# Patient Record
Sex: FEMALE | URBAN - METROPOLITAN AREA
[De-identification: names, ages, dates, MRNs, and addresses within clinical notes are randomized per-mention and may not be internally consistent; named-entity substitution may affect disease eponyms.]

---

## 2017-01-29 DIAGNOSIS — G93.2 IDIOPATHIC INTRACRANIAL HYPERTENSION: Primary | ICD-10-CM

## 2017-01-29 DIAGNOSIS — H53.10 SUBJECTIVE VISUAL DISTURBANCE: ICD-10-CM

## 2017-02-02 ENCOUNTER — OFFICE VISIT (OUTPATIENT)
Dept: OPHTHALMOLOGY | Facility: CLINIC | Age: 67
End: 2017-02-02
Attending: OPHTHALMOLOGY
Payer: MEDICARE

## 2017-02-02 DIAGNOSIS — H53.10 SUBJECTIVE VISUAL DISTURBANCE: ICD-10-CM

## 2017-02-02 DIAGNOSIS — G93.2 IIH (IDIOPATHIC INTRACRANIAL HYPERTENSION): Primary | ICD-10-CM

## 2017-02-02 DIAGNOSIS — G93.2 IDIOPATHIC INTRACRANIAL HYPERTENSION: ICD-10-CM

## 2017-02-02 PROCEDURE — 92133 CPTRZD OPH DX IMG PST SGM ON: CPT | Mod: ZF | Performed by: OPHTHALMOLOGY

## 2017-02-02 PROCEDURE — 99213 OFFICE O/P EST LOW 20 MIN: CPT | Mod: ZF

## 2017-02-02 PROCEDURE — 92083 EXTENDED VISUAL FIELD XM: CPT | Mod: ZF | Performed by: OPHTHALMOLOGY

## 2017-02-02 ASSESSMENT — SLIT LAMP EXAM - LIDS
COMMENTS: NORMAL
COMMENTS: NORMAL

## 2017-02-02 ASSESSMENT — TONOMETRY
OS_IOP_MMHG: 17
OD_IOP_MMHG: 15
IOP_METHOD: TONOPEN

## 2017-02-02 ASSESSMENT — EXTERNAL EXAM - LEFT EYE: OS_EXAM: NORMAL

## 2017-02-02 ASSESSMENT — CONF VISUAL FIELD
OD_NORMAL: 1
OS_NORMAL: 1

## 2017-02-02 ASSESSMENT — VISUAL ACUITY
OD_SC: 20/20
METHOD: SNELLEN - LINEAR
OS_SC+: +1
OD_SC+: -2
OS_SC: 20/25

## 2017-02-02 ASSESSMENT — EXTERNAL EXAM - RIGHT EYE: OD_EXAM: NORMAL

## 2017-02-02 NOTE — NURSING NOTE
"Chief Complaints and History of Present Illnesses   Patient presents with     Neurologic Problem     iih     HPI    Affected eye(s):  Both   Symptoms:     Blurred vision      Frequency:  Intermittent       Do you have eye pain now?:  No      Comments:  - Currently on 500 mg diamox daily. Feels dose is not strong enough. Still c/o \"thumping\" sensation in head. Worse in the evening.   - feels vision in right eye is more blurry.     Lidia TRACY 10:15 AM February 2, 2017                    "

## 2017-02-02 NOTE — PROGRESS NOTES
Assessment & Plan     Yasmeen Mcdonald is a 66 year old female with the following diagnoses:   1. Idiopathic intracranial hypertension    2. Subjective visual disturbance       She had unilat optic disc edema and Idiopathic Intracranial Hypertension (IIH). Her optic disc edema improved using diamox but when stopped her optic disc edema returned.  Today, her optic disc edema is again much improved.  She is currently on 250 twice a day of diamox.  Will go down slowly.  Cut to 375 x 2 months, then 250 x 2 months, then 125 and follow up in 6 months on 125.           Attending Physician Attestation:  I have seen and examined this patient.  I have confirmed and edited as necessary the chief complaint(s), history of present illness, review of systems, relevant history, and examination findings as documented by others.  I have personally reviewed the relevant tests, images, and reports as documented above.  I have confirmed and edited as necessary the assessment and plan and agree with this note.  - Ryan Neville MD 11:00 AM 2/2/2017

## 2017-02-02 NOTE — MR AVS SNAPSHOT
After Visit Summary   2/2/2017    Yasmeen Mcdonald    MRN: [id number]           Patient Information     Date Of Birth          1950        Visit Information        Provider Department      2/2/2017 10:30 AM Ryan Neville MD [facility]        Today's Diagnoses     IIH (idiopathic intracranial hypertension)    -  1     Idiopathic intracranial hypertension         Subjective visual disturbance            Follow-ups after your visit        Follow-up notes from your care team     Return in about 6 months (around 8/2/2017).      Your next 10 appointments already scheduled     Aug 03, 2017 10:00 AM   RETURN NEURO with Ryan Neville MD   Eye Clinic ([facility])    [address]  [address]  [address]  [facility] [id number] [phone]              Future tests that were ordered for you today     Open Future Orders        Priority Expected Expires Ordered    Color Vision - Screening OU (both eyes) Routine  8/6/2018 2/2/2017    DILATED FUNDUS EXAM Routine  8/6/2018 2/2/2017    IOP Measurement Routine  8/6/2018 2/2/2017    OCT Optic Nerve RNFL Spectralis OU (both eyes) Routine  8/6/2018 2/2/2017            Who to contact     Please call your clinic at [phone] to:    Ask questions about your health    Make or cancel appointments    Discuss your medicines    Learn about your test results    Speak to your doctor   If you have compliments or concerns about an experience at your clinic, or if you wish to file a complaint, please contact [facility] Patient Relations at [phone] or email us at [email or web address]         Additional Information About Your Visit        MyChart Information     FlightOffice is an electronic gateway that provides easy, online access to your medical records. With FlightOffice, you can request a clinic appointment, read your test results, renew a prescription or communicate with your care team.     To  sign up for Togic Softwaret visit the website at [email or web address]   You will be asked to enter the access code listed below, as well as some personal information. Please follow the directions to create your username and password.     Your access code is: [email or web address]  Expires: 2017  8:30 AM     Your access code will  in 90 days. If you need help or a new code, please contact your [facility] or call [phone] for assistance.        Care EveryWhere ID     This is your Care EveryWhere ID. This could be used by other organizations to access your [place] medical records  [id number]         Blood Pressure from Last 3 Encounters:   16 135/66    Weight from Last 3 Encounters:   No data found for Wt              We Performed the Following     Color Vision - Screening OU (both eyes)     Glaucoma Top OU     IOP Measurement     OCT Optic Nerve RNFL Spectralis OU (both eyes)        Primary Care Provider Office Phone # Fax #    Carola Grant Monty Loja [phone]       [facility] [address]  [facility] [address]        Thank you!     Thank you for choosing EYE CLINIC  for your care. Our goal is always to provide you with excellent care. Hearing back from our patients is one way we can continue to improve our services. Please take a few minutes to complete the written survey that you may receive in the mail after your visit with us. Thank you!             Your Updated Medication List - Protect others around you: Learn how to safely use, store and throw away your medicines at www.disposemymeds.org.          This list is accurate as of: 17 11:05 AM.  Always use your most recent med list.                   Brand Name Dispense Instructions for use    * acetaZOLAMIDE 250 MG tablet    DIAMOX    120 tablet    Take 2 tablets (500 mg) by mouth 2 times daily       * acetaZOLAMIDE 125 MG tablet    DIAMOX    60 tablet    Take 1 tablet  (125 mg) by mouth 2 times daily       * acetaZOLAMIDE 250 MG tablet    DIAMOX    30 tablet    Take 0.5 tablets (125 mg) by mouth 2 times daily       * acetaZOLAMIDE 250 MG tablet    DIAMOX    60 tablet    Take 1 tablet (250 mg) by mouth 2 times daily       CITALOPRAM HYDROBROMIDE PO      Take 20 mg by mouth daily       cyanocolbalamin 100 MCG tablet    vitamin  B-12     Take 50 mcg by mouth daily       fluticasone 50 MCG/ACT spray    FLONASE     Spray 2 sprays into both nostrils daily       glucosamine chondroitin 1500 complex Caps      Take 2 tablets by mouth daily       hydroxychloroquine 200 MG tablet    PLAQUENIL     Take 200 mg by mouth 2 times daily       minoxidil 2 % external solution    ROGAINE     Apply topically 2 times daily       mometasone-formoterol 200-5 MCG/ACT oral inhaler    DULERA     Inhale 2 puffs into the lungs 2 times daily       montelukast 10 MG tablet    SINGULAIR     Take 10 mg by mouth At Bedtime       * Notice:  This list has 4 medication(s) that are the same as other medications prescribed for you. Read the directions carefully, and ask your doctor or other care provider to review them with you.

## 2017-05-18 DIAGNOSIS — H47.10 OPTIC DISC EDEMA: ICD-10-CM

## 2017-05-18 DIAGNOSIS — G93.2 IIH (IDIOPATHIC INTRACRANIAL HYPERTENSION): ICD-10-CM

## 2017-05-18 DIAGNOSIS — H53.10 SUBJECTIVE VISUAL DISTURBANCE: ICD-10-CM

## 2017-05-18 RX ORDER — ACETAZOLAMIDE 250 MG/1
250 TABLET ORAL DAILY
Qty: 15 TABLET | Refills: 0 | Status: SHIPPED | OUTPATIENT
Start: 2017-05-18 | End: 2017-08-03

## 2017-05-18 RX ORDER — ACETAZOLAMIDE 125 MG/1
125 TABLET ORAL DAILY
Qty: 30 TABLET | Refills: 6 | Status: SHIPPED | OUTPATIENT
Start: 2017-05-18 | End: 2017-08-03

## 2017-08-03 ENCOUNTER — OFFICE VISIT (OUTPATIENT)
Dept: OPHTHALMOLOGY | Facility: CLINIC | Age: 67
End: 2017-08-03
Attending: OPHTHALMOLOGY
Payer: MEDICARE

## 2017-08-03 DIAGNOSIS — G93.2 IDIOPATHIC INTRACRANIAL HYPERTENSION: ICD-10-CM

## 2017-08-03 DIAGNOSIS — H53.10 SUBJECTIVE VISUAL DISTURBANCE: ICD-10-CM

## 2017-08-03 DIAGNOSIS — G93.2 IIH (IDIOPATHIC INTRACRANIAL HYPERTENSION): ICD-10-CM

## 2017-08-03 PROCEDURE — 99214 OFFICE O/P EST MOD 30 MIN: CPT | Mod: ZF

## 2017-08-03 PROCEDURE — 92133 CPTRZD OPH DX IMG PST SGM ON: CPT | Mod: ZF | Performed by: OPHTHALMOLOGY

## 2017-08-03 ASSESSMENT — EXTERNAL EXAM - LEFT EYE: OS_EXAM: NORMAL

## 2017-08-03 ASSESSMENT — CONF VISUAL FIELD
OD_NORMAL: 1
OS_NORMAL: 1

## 2017-08-03 ASSESSMENT — VISUAL ACUITY
OS_SC+: +1
OS_SC: 20/25
OD_SC: 20/20
OD_SC+: -2
METHOD: SNELLEN - LINEAR

## 2017-08-03 ASSESSMENT — REFRACTION_WEARINGRX
SPECS_TYPE: READERS
OS_CYLINDER: +0.50
OD_CYLINDER: +0.50
OS_SPHERE: +3.50
OD_AXIS: 175
OS_AXIS: 012
OD_SPHERE: +3.00

## 2017-08-03 ASSESSMENT — SLIT LAMP EXAM - LIDS
COMMENTS: NORMAL
COMMENTS: NORMAL

## 2017-08-03 ASSESSMENT — TONOMETRY
OD_IOP_MMHG: 18
OS_IOP_MMHG: 20
IOP_METHOD: TONOPEN

## 2017-08-03 ASSESSMENT — EXTERNAL EXAM - RIGHT EYE: OD_EXAM: NORMAL

## 2017-08-03 NOTE — MR AVS SNAPSHOT
After Visit Summary   8/3/2017    Yasmeen Mcdonald    MRN: [id number]           Patient Information     Date Of Birth          1950        Visit Information        Provider Department      8/3/2017 10:00 AM Ryan Neville MD [facility]        Today's Diagnoses     Idiopathic intracranial hypertension        Subjective visual disturbance        IIH (idiopathic intracranial hypertension)           Follow-ups after your visit        Follow-up notes from your care team     Return in about 3 months (around 11/3/2017) for Vision, color, tension, dilate, RNFL.      Your next 10 appointments already scheduled     Nov 07, 2017  1:00 PM CST   RETURN NEURO with Ryan Neville MD   Eye Clinic ([facility])    [address]  [address] [facility]  [address]th [address] Clin [address]a  [facility] [address] [phone]              Who to contact     Please call your clinic at [phone] to:    Ask questions about your health    Make or cancel appointments    Discuss your medicines    Learn about your test results    Speak to your doctor   If you have compliments or concerns about an experience at your clinic, or if you wish to file a complaint, please contact [facility] Patient Relations at [phone] or email us at [email or web address]         Additional Information About Your Visit        MyChart Information     Omnituret is an electronic gateway that provides easy, online access to your medical records. With Glad to Have You, you can request a clinic appointment, read your test results, renew a prescription or communicate with your care team.     To sign up for Omnituret visit the website at [email or web address]   You will be asked to enter the access code listed below, as well as some personal information. Please follow the directions to create your username and password.     Your access code is: [email or web address]  Expires: 10/18/2017  6:30 AM     Your access code will   in 90 days. If you need help or a new code, please contact your [facility] or call [phone] for assistance.        Care EveryWhere ID     This is your Care EveryWhere ID. This could be used by other organizations to access your [place] medical records  [id number]         Blood Pressure from Last 3 Encounters:   16 135/66    Weight from Last 3 Encounters:   No data found for Wt              We Performed the Following     DILATED FUNDUS EXAM     IOP Measurement     OCT Optic Nerve RNFL Spectralis OU (both eyes)          Today's Medication Changes          These changes are accurate as of: 8/3/17 11:07 AM.  If you have any questions, ask your nurse or doctor.               Stop taking these medicines if you haven't already. Please contact your care team if you have questions.     acetaZOLAMIDE 125 MG tablet   Commonly known as:  DIAMOX           acetaZOLAMIDE 250 MG tablet   Commonly known as:  DIAMOX           cyanocolbalamin 100 MCG tablet   Commonly known as:  vitamin  B-12           mometasone-formoterol 200-5 MCG/ACT oral inhaler   Commonly known as:  DULERA                    Primary Care Provider Office Phone # Fax #    Carola Graysoncristo [place] [phone]       [facility] [address]  [facility] [address]        Equal Access to Services     ADRIAN AVELAR : Hadii paras ku hadasho Soomaali, waaxda luqadaha, qaybta kaalmada adeegyada, waxay rosalia chaves. So [facility] [phone].    ATENCIÓN: Si habla español, tiene a sabillon disposición servicios gratuitos de asistencia lingüística. Llvonnie al [phone].    We comply with applicable federal civil rights laws and [place] laws. We do not discriminate on the basis of race, color, national origin, age, disability sex, sexual orientation or gender identity.            Thank you!     Thank you for choosing EYE CLINIC  for your care. Our goal is always to provide  you with excellent care. Hearing back from our patients is one way we can continue to improve our services. Please take a few minutes to complete the written survey that you may receive in the mail after your visit with us. Thank you!             Your Updated Medication List - Protect others around you: Learn how to safely use, store and throw away your medicines at www.disposemymeds.org.          This list is accurate as of: 8/3/17 11:07 AM.  Always use your most recent med list.                   Brand Name Dispense Instructions for use Diagnosis    CITALOPRAM HYDROBROMIDE PO      Take 20 mg by mouth daily        fluticasone 50 MCG/ACT spray    FLONASE     Spray 2 sprays into both nostrils daily        glucosamine chondroitin 1500 complex Caps      Take 2 tablets by mouth daily        hydroxychloroquine 200 MG tablet    PLAQUENIL     Take 200 mg by mouth 2 times daily        minoxidil 2 % external solution    ROGAINE     Apply topically 2 times daily        montelukast 10 MG tablet    SINGULAIR     Take 10 mg by mouth At Bedtime

## 2017-08-03 NOTE — NURSING NOTE
Chief Complaints and History of Present Illnesses   Patient presents with     Neurologic Problem     iih     HPI    Symptoms:              Comments:  Yasmeen is a 67 year old female presenting with a history of:    1. IIH    Tapered from 250 mg diamox to nothing. Discontinued 125 mg yesterday. Still c/o pressure feeling in head. No blurred vision. Wondering if she is being screened for plaquenil toxicity. Has been on plaquenil since 2009 for RA.     Lidia TRACY 9:46 AM August 3, 2017

## 2017-08-03 NOTE — PROGRESS NOTES
Assessment & Plan     Yasmeen Mcdonald is a 67 year old female with the following diagnoses:   1. Idiopathic intracranial hypertension    2. Subjective visual disturbance    3. 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 Patient initially had improvement of disc edema, but edema returned on discontinuation of Diamox. Patient seen 6 months ago with improved disc edema and taper initiated. Patient taking 125 mg daily. She notes that she has slowly worsening pressure inside her head. Reports feeling that her ears have pressure constantly, but no pulsatile tinnitus. Reports occasional tinnitus and ear pain. Denies change in vision, diplopia, nauseated, vomiting.    OCT rNFL does not demonstrate swelling of the right optic disc nor the left. No edema present on examination. Decreased hearing in the left ear by finger rubbing. Patient without signs of active IIH. Will stop diamox and follow up in 2-3 months.      She has greater occipital neuralgia.  We discussed neck PT, gabapentin, and greater occipital nerve block and she wants to do nothing.  She has what sounds to be tension type headache. Could consider NSAIDs.               Attending Physician Attestation:  Complete documentation of historical and exam elements from today's encounter can be found in the full encounter summary report (not reduplicated in this progress note).  I personally obtained the chief complaint(s) and history of present illness.  I confirmed and edited as necessary the review of systems, past medical/surgical history, family history, social history, and examination findings as documented by others; and I examined the patient myself.  I personally reviewed the relevant tests, images, and reports as documented above.  I formulated and edited as necessary the assessment and plan and discussed the findings and management plan with the patient and  family. - Ryan Neville MD

## 2018-03-28 DIAGNOSIS — G93.2 IIH (IDIOPATHIC INTRACRANIAL HYPERTENSION): Primary | ICD-10-CM

## 2018-03-29 ENCOUNTER — OFFICE VISIT (OUTPATIENT)
Dept: OPHTHALMOLOGY | Facility: CLINIC | Age: 68
End: 2018-03-29
Attending: OPHTHALMOLOGY
Payer: MEDICARE

## 2018-03-29 DIAGNOSIS — H47.393 OTHER DISORDERS OF OPTIC DISC, BILATERAL: Primary | ICD-10-CM

## 2018-03-29 DIAGNOSIS — G93.2 IIH (IDIOPATHIC INTRACRANIAL HYPERTENSION): ICD-10-CM

## 2018-03-29 PROCEDURE — 92133 CPTRZD OPH DX IMG PST SGM ON: CPT | Mod: ZF | Performed by: OPHTHALMOLOGY

## 2018-03-29 PROCEDURE — G0463 HOSPITAL OUTPT CLINIC VISIT: HCPCS | Mod: ZF

## 2018-03-29 ASSESSMENT — REFRACTION_WEARINGRX
OD_AXIS: 175
OS_AXIS: 012
OS_SPHERE: +3.50
OS_CYLINDER: +0.50
OD_CYLINDER: +0.50
OD_SPHERE: +3.00
SPECS_TYPE: READERS

## 2018-03-29 ASSESSMENT — VISUAL ACUITY
OD_CC: 20/20
METHOD: SNELLEN - LINEAR
OS_CC: 20/30
OS_CC+: +2

## 2018-03-29 ASSESSMENT — EXTERNAL EXAM - RIGHT EYE: OD_EXAM: NORMAL

## 2018-03-29 ASSESSMENT — CONF VISUAL FIELD
OS_NORMAL: 1
OD_NORMAL: 1

## 2018-03-29 ASSESSMENT — TONOMETRY
IOP_METHOD: ICARE
OD_IOP_MMHG: 14
OS_IOP_MMHG: 15

## 2018-03-29 ASSESSMENT — SLIT LAMP EXAM - LIDS
COMMENTS: NORMAL
COMMENTS: NORMAL

## 2018-03-29 ASSESSMENT — EXTERNAL EXAM - LEFT EYE: OS_EXAM: NORMAL

## 2018-03-29 NOTE — MR AVS SNAPSHOT
After Visit Summary   3/29/2018    Yasmeen Mcdonald    MRN: [id number]           Patient Information     Date Of Birth          1950        Visit Information        Provider Department      3/29/2018 10:30 AM Ryan Neville MD [facility]        Today's Diagnoses     Other disorders of optic disc, bilateral    -  1    IIH (idiopathic intracranial hypertension)           Follow-ups after your visit        Follow-up notes from your care team     Return in about 6 months (around 2018) for Vision, color, tension, dilate, RNFL.      Your next 10 appointments already scheduled     Oct 04, 2018 10:30 AM CDT   RETURN NEURO with Ryan Neville MD   Eye Clinic ([facility])    [address] [place] [id number] [phone]              Who to contact     Please call your clinic at [phone] to:    Ask questions about your health    Make or cancel appointments    Discuss your medicines    Learn about your test results    Speak to your doctor            Additional Information About Your Visit        MyChart Information     Tributes.com is an electronic gateway that provides easy, online access to your medical records. With Tributes.com, you can request a clinic appointment, read your test results, renew a prescription or communicate with your care team.     To sign up for Transphormt visit the website at [email or web address]   You will be asked to enter the access code listed below, as well as some personal information. Please follow the directions to create your username and password.     Your access code is: [email or web address]  Expires: 2018  6:30 AM     Your access code will  in 90 days. If you need help or a new code, please contact your [facility] or call [phone] for assistance.        Care EveryWhere ID     This is your Care EveryWhere ID. This could be used by other organizations to  access your [place] medical records  [id number]         Blood Pressure from Last 3 Encounters:   04/27/16 135/66    Weight from Last 3 Encounters:   No data found for Wt              We Performed the Following     DILATED FUNDUS EXAM     IOP Measurement     OCT Optic Nerve RNFL Spectralis OU (both eyes)        Primary Care Provider Office Phone # Fax #    Carola Loja [phone]       [facility] [address]  [place] [address]        Equal Access to Services     ADRIAN AVELAR : Hadii aad ku hadasho Soomaali, waaxda luqadaha, qaybta kaalmada adeegyada, waxay idiin hayaan adeeg kharash larick . So [facility] [phone].    ATENCIÓN: Si habla español, tiene a sabillon disposición servicios gratuitos de asistencia lingüística. [facility] [phone].    We comply with applicable federal civil rights laws and [place] laws. We do not discriminate on the basis of race, color, national origin, age, disability, sex, sexual orientation, or gender identity.            Thank you!     Thank you for choosing EYE CLINIC  for your care. Our goal is always to provide you with excellent care. Hearing back from our patients is one way we can continue to improve our services. Please take a few minutes to complete the written survey that you may receive in the mail after your visit with us. Thank you!             Your Updated Medication List - Protect others around you: Learn how to safely use, store and throw away your medicines at www.disposemymeds.org.          This list is accurate as of 3/29/18 10:38 AM.  Always use your most recent med list.                   Brand Name Dispense Instructions for use Diagnosis    butamben-tetracaine-benzocaine 2-2-14 % spray    CETACAINE     Apply 1 spray topically as needed        CITALOPRAM HYDROBROMIDE PO      Take 20 mg by mouth daily        fluticasone 50 MCG/ACT spray    FLONASE     Spray 2 sprays into both nostrils daily        glucosamine  chondroitin 1500 complex Caps      Take 2 tablets by mouth daily        hydroxychloroquine 200 MG tablet    PLAQUENIL     Take 200 mg by mouth 2 times daily        minoxidil 2 % external solution    ROGAINE     Apply topically 2 times daily        montelukast 10 MG tablet    SINGULAIR     Take 10 mg by mouth At Bedtime        TOPIRAMATE PO

## 2018-03-29 NOTE — NURSING NOTE
Chief Complaints and History of Present Illnesses   Patient presents with     Neurologic Problem     IIH F/U      HPI    Symptoms:              Comments:   Yasmeen Mcdonald is a 67 year old female with the following diagnoses:   1. Idiopathic intracranial hypertension   2. Subjective visual disturbance     Discontinued diamox at last visit.   Saw gp a few weeks after, c/o pressure behind right eye after discontinuing medication. Saw neurologist, got MRI and LP. Was re-started on diamox (higher dose). Caused diarrhea, so she discontinued it and was started on two other medications (topiramate is one of them).   Still c/o pressure behind the right eye.   No headaches  Not as bad on these two medications per patient account.     Lidia TRACY 10:06 AM March 29, 2018

## 2018-03-29 NOTE — PROGRESS NOTES
Assessment & Plan     Yasmeen Mcdonald is a 67 year old female with the following diagnoses:   1. Other disorders of optic disc, bilateral    2. 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 Patient initially had improvement of disc edema, but edema returned on discontinuation of Diamox. Patient seen 6 months ago without optic disc edema and I stopped diamox last visit.  She is on topiramate 25 twice a day and bumetanide every morning.       She does not have optic disc edema today.  She had a lumbar puncture and MRI since her last visit because she gets a pressure sensation behind her RIGHT eye.      I don't think she has a recurrence of Idiopathic Intracranial Hypertension (IIH). However, I have not seen her off meds for it.  Would stop bumetanide.  Stay on topiramate for now.  Follow up 6 months.              Attending Physician Attestation:  Complete documentation of historical and exam elements from today's encounter can be found in the full encounter summary report (not reduplicated in this progress note).  I personally obtained the chief complaint(s) and history of present illness.  I confirmed and edited as necessary the review of systems, past medical/surgical history, family history, social history, and examination findings as documented by others; and I examined the patient myself.  I personally reviewed the relevant tests, images, and reports as documented above.  I formulated and edited as necessary the assessment and plan and discussed the findings and management plan with the patient and family. - Ryan Neville MD

## 2018-10-04 ENCOUNTER — OFFICE VISIT (OUTPATIENT)
Dept: OPHTHALMOLOGY | Facility: CLINIC | Age: 68
End: 2018-10-04
Attending: OPHTHALMOLOGY
Payer: MEDICARE

## 2018-10-04 DIAGNOSIS — G93.2 IDIOPATHIC INTRACRANIAL HYPERTENSION: ICD-10-CM

## 2018-10-04 DIAGNOSIS — H47.10 OPTIC DISC EDEMA: Primary | ICD-10-CM

## 2018-10-04 DIAGNOSIS — H47.10 OPTIC DISC EDEMA: ICD-10-CM

## 2018-10-04 PROCEDURE — 92133 CPTRZD OPH DX IMG PST SGM ON: CPT | Mod: ZF | Performed by: OPHTHALMOLOGY

## 2018-10-04 PROCEDURE — G0463 HOSPITAL OUTPT CLINIC VISIT: HCPCS | Mod: ZF

## 2018-10-04 ASSESSMENT — VISUAL ACUITY
OS_SC+: +2
OS_SC: 20/25
OD_SC: 20/20
METHOD: SNELLEN - LINEAR

## 2018-10-04 ASSESSMENT — EXTERNAL EXAM - RIGHT EYE: OD_EXAM: NORMAL

## 2018-10-04 ASSESSMENT — REFRACTION_WEARINGRX
OD_CYLINDER: +0.50
OS_CYLINDER: +0.50
OS_SPHERE: +3.50
OD_AXIS: 175
OS_AXIS: 012
OD_SPHERE: +3.00
SPECS_TYPE: READERS

## 2018-10-04 ASSESSMENT — TONOMETRY
OS_IOP_MMHG: 15
OD_IOP_MMHG: 18
IOP_METHOD: ICARE

## 2018-10-04 ASSESSMENT — EXTERNAL EXAM - LEFT EYE: OS_EXAM: NORMAL

## 2018-10-04 ASSESSMENT — SLIT LAMP EXAM - LIDS
COMMENTS: NORMAL
COMMENTS: NORMAL

## 2018-10-04 ASSESSMENT — CONF VISUAL FIELD
OS_NORMAL: 1
OD_NORMAL: 1

## 2018-10-04 NOTE — PROGRESS NOTES
Assessment & Plan     Yasmeen Mcdonald is a 67 year old female with the following diagnoses:   1. Optic disc edema    2. 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 Here for 7 month f/u.     Diagnosed in 2015. Patient initially had improvement of disc edema, but edema returned on discontinuation of Diamox 11/2016.  She had resolution of her disc edema 8/2017. Diamox stopped at that visit.     Last visit 7 months ago, optic nerves not swollen, so we stopped bumex (q AM).     She was continued on is on topiramate 25 twice a day. She is doing well. No headaches. No vision changes. No double     She does not have optic disc edema today.  We can start tapering off topamax: 25 mg/d x 2 months then stop.     She is also on plaquneil 200mg twice a day (mac OCT looks normal today).     Follow up 6 months.          Attending Physician Attestation:  Complete documentation of historical and exam elements from today's encounter can be found in the full encounter summary report (not reduplicated in this progress note).  I personally obtained the chief complaint(s) and history of present illness.  I confirmed and edited as necessary the review of systems, past medical/surgical history, family history, social history, and examination findings as documented by others; and I examined the patient myself.  I personally reviewed the relevant tests, images, and reports as documented above.  I formulated and edited as necessary the assessment and plan and discussed the findings and management plan with the patient and family. - Ryan Neville MD

## 2018-10-04 NOTE — LETTER
10/4/2018         RE:  :  MRN: Yasmeen Mcdonald  1950  [id number]     Dear Dr. Bryant,    Your patient, Yasmeen Mcdonald, returned for neuro-ophthalmic follow up. My assessment and plan are below.  For further details, please see my attached clinic note.      Assessment & Plan   Yasmeen Mcdonald is a 67 year old female with the following diagnoses:   1. Optic disc edema    2. 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 Here for 7 month f/u.     Diagnosed in . Patient initially had improvement of disc edema, but edema returned on discontinuation of Diamox 2016.  She had resolution of her disc edema 2017. Diamox stopped at that visit.     Last visit 7 months ago, optic nerves not swollen, so we stopped bumex (q AM).     She was continued on is on topiramate 25 twice a day. She is doing well. No headaches. No vision changes. No double     She does not have optic disc edema today.  We can start tapering off topamax: 25 mg/d x 2 months then stop.     Follow up 6 months.     Again, thank you for allowing me to participate in the care of your patient.      Sincerely,    Ryan Neville MD  Professor, Neuro-Ophthalmology  Department of Ophthalmology and Visual Neurosciences  [facility]      CC: Chaparro Bryant MD  [facility]  [address]  [facility] [address]  VIA In Basket     Carola Loja MD  [facility]  [address]  [facility] [address]  VIA Facsimile: [phone]       DX = Idiopathic Intracranial Hypertension (IIH)

## 2018-10-04 NOTE — MR AVS SNAPSHOT
After Visit Summary   10/4/2018    Yasmeen Mcdonald    MRN: [id number]           Patient Information     Date Of Birth          1950        Visit Information        Provider Department      10/4/2018 10:30 AM Ryan Neville MD [facility]        Today's Diagnoses     Optic disc edema        Idiopathic intracranial hypertension           Follow-ups after your visit        Your next 10 appointments already scheduled     2019 10:30 AM CST   RETURN NEURO with Ryan Neville MD   Eye Clinic ([facility])    [address]  [facility] [address]   [phone]              Future tests that were ordered for you today     Open Future Orders        Priority Expected Expires Ordered    IOP Measurement Routine  12/3/2018 10/4/2018    OCT Optic Nerve RNFL Spectralis OU (both eyes) Routine  12/3/2018 10/4/2018            Who to contact     Please call your clinic at [phone] to:    Ask questions about your health    Make or cancel appointments    Discuss your medicines    Learn about your test results    Speak to your doctor            Additional Information About Your Visit        MyChart Information     Confer is an electronic gateway that provides easy, online access to your medical records. With Confer, you can request a clinic appointment, read your test results, renew a prescription or communicate with your care team.     To sign up for Confer visit the website at [email or web address]   You will be asked to enter the access code listed below, as well as some personal information. Please follow the directions to create your username and password.     Your access code is: [email or web address]  Expires: 2018  6:31 AM     Your access code will  in 90 days. If you need help or a new code, please contact your [facility] or call [phone] for assistance.        Care EveryWhere ID      This is your Care EveryWhere ID. This could be used by other organizations to access your [place] medical records  [id number]         Blood Pressure from Last 3 Encounters:   04/27/16 135/66    Weight from Last 3 Encounters:   No data found for Wt              We Performed the Following     Color Vision - Screening OU (both eyes)     OCT Optic Nerve RNFL Spectralis OU (both eyes)        Primary Care Provider Office Phone # Fax #    Carola Loja [phone]       [facility] [address]  [place] [address]        Equal Access to Services     [facility]: Hadii aad ku hadasho Soomaali, waaxda luqadaha, qaybta kaalmada aderitayafranky, kevin warren . So [facility] [phone].    ATENCIÓN: Si habla español, tiene a sabillon disposición servicios gratuitos de asistencia lingüística. [facility] [phone].    We comply with applicable federal civil rights laws and [place] laws. We do not discriminate on the basis of race, color, national origin, age, disability, sex, sexual orientation, or gender identity.            Thank you!     Thank you for choosing EYE CLINIC  for your care. Our goal is always to provide you with excellent care. Hearing back from our patients is one way we can continue to improve our services. Please take a few minutes to complete the written survey that you may receive in the mail after your visit with us. Thank you!             Your Updated Medication List - Protect others around you: Learn how to safely use, store and throw away your medicines at www.disposemymeds.org.          This list is accurate as of 10/4/18 11:30 AM.  Always use your most recent med list.                   Brand Name Dispense Instructions for use Diagnosis    butamben-tetracaine-benzocaine 2-2-14 % spray    CETACAINE     Apply 1 spray topically as needed        CITALOPRAM HYDROBROMIDE PO      Take 20 mg by mouth daily        fluticasone 50 MCG/ACT  spray    FLONASE     Spray 2 sprays into both nostrils daily        glucosamine chondroitin 1500 complex Caps      Take 2 tablets by mouth daily        hydroxychloroquine 200 MG tablet    PLAQUENIL     Take 200 mg by mouth 2 times daily        minoxidil 2 % external solution    ROGAINE     Apply topically 2 times daily        montelukast 10 MG tablet    SINGULAIR     Take 10 mg by mouth At Bedtime        TOPIRAMATE PO

## 2018-10-04 NOTE — NURSING NOTE
Chief Complaints and History of Present Illnesses   Patient presents with     Neurologic Problem     IIH F/U      HPI    Symptoms:              Comments:  Yasmeen Mcdonald is a 68 year old female with the following diagnoses:   1. Other disorders of optic disc, bilateral   2. IIH (idiopathic intracranial hypertension)      Discontinued butamben since the last visit, but still taking Topamax  Denies headaches  Denies visual changes. Difficulty seeing at distance.   Intermittent swooshing, less frequent than in the past.     Lidia TRACY 10:25 AM October 4, 2018

## 2019-02-07 ENCOUNTER — TELEPHONE (OUTPATIENT)
Dept: OPHTHALMOLOGY | Facility: CLINIC | Age: 69
End: 2019-02-07

## 2019-02-07 DIAGNOSIS — G93.2 IIH (IDIOPATHIC INTRACRANIAL HYPERTENSION): Primary | ICD-10-CM

## 2019-02-07 NOTE — TELEPHONE ENCOUNTER
Spoke to pt about taper instructions per last note 10-4-18     She does not have optic disc edema today.  We can start tapering off topamax: 25 mg/d x 2 months then stop.     Pt states was not aware and been using 25 mg 2/day    Stated may decrease to 25 mg/day and will review with neuro-ophthalmology team for how long before discontinuing  Pt has f/u in April  Ludwig Bojorquez RN 8:36 AM 02/07/19

## 2019-02-07 NOTE — TELEPHONE ENCOUNTER
She does not have optic disc edema today.  We can start tapering off topamax: 25 mg/d x 2 months then stop.      10-4-18 information above about topamax  Left message with information about tapering instructions for 2 months then discontinuing  Provided direct number for further assistance  Ludwig Bojorquez RN 8:31 AM 02/07/19           Health Call Center    Phone Message    May a detailed message be left on voicemail: yes    Reason for Call: Medication Question or concern regarding medication   Prescription Clarification  Name of Medication: topiramate 25mg, pt takes it twice daily   Prescribing Provider: Original prescribing provider was a neurologist but it was Dr Neville that wants pt to continue it.    Pharm[facility] [address] [facility] [place] [address]  Ph#: [phone]   What on the order needs clarification? Pt states that Pt will be running out of this medication in 15 days and it was Dr Neville that wanted pt to stay on it. Pt hasn't seen her neurologist for over a year. Pt states that she's not having any issues so Pt wants to know if Dr Neville wants to prescribe this to her or if he wants Pt to ween off of it. Please f/u.     Action Taken: Message routed to:  [facility]

## 2019-02-08 NOTE — TELEPHONE ENCOUNTER
Left voicemail for patient clarifying that does need to taper topamax 25 mg/day x 2 months, and then follow up as scheduled in April.  Gave direct line if needs anything further.      Fe Meier, COA, OSC   February 8, 2019 at 12:05 pm

## 2019-02-11 RX ORDER — TOPIRAMATE 25 MG/1
25 TABLET, FILM COATED ORAL DAILY
Qty: 30 TABLET | Refills: 0 | Status: SHIPPED | OUTPATIENT
Start: 2019-02-11 | End: 2019-05-14

## 2019-04-18 ENCOUNTER — OFFICE VISIT (OUTPATIENT)
Dept: OPHTHALMOLOGY | Facility: CLINIC | Age: 69
End: 2019-04-18
Attending: OPHTHALMOLOGY
Payer: MEDICARE

## 2019-04-18 DIAGNOSIS — H53.10 SUBJECTIVE VISUAL DISTURBANCE: ICD-10-CM

## 2019-04-18 DIAGNOSIS — H53.10 SUBJECTIVE VISUAL DISTURBANCE: Primary | ICD-10-CM

## 2019-04-18 DIAGNOSIS — G93.2 IIH (IDIOPATHIC INTRACRANIAL HYPERTENSION): Primary | ICD-10-CM

## 2019-04-18 PROCEDURE — G0463 HOSPITAL OUTPT CLINIC VISIT: HCPCS | Mod: ZF | Performed by: TECHNICIAN/TECHNOLOGIST

## 2019-04-18 ASSESSMENT — VISUAL ACUITY
OD_SC: 20/20
OS_SC+: -1
OS_SC: 20/25
METHOD: SNELLEN - LINEAR
OD_SC+: -1

## 2019-04-18 ASSESSMENT — EXTERNAL EXAM - LEFT EYE: OS_EXAM: NORMAL

## 2019-04-18 ASSESSMENT — TONOMETRY
IOP_METHOD: ICARE
OD_IOP_MMHG: 20
OS_IOP_MMHG: 20

## 2019-04-18 ASSESSMENT — SLIT LAMP EXAM - LIDS
COMMENTS: NORMAL
COMMENTS: NORMAL

## 2019-04-18 ASSESSMENT — EXTERNAL EXAM - RIGHT EYE: OD_EXAM: NORMAL

## 2019-04-18 NOTE — PROGRESS NOTES
Assessment & Plan     Yasmeen Mcdonald is a 67 year old female with the following diagnoses:   1. 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 Here for 6 month f/u.     Diagnosed in 2015. Patient initially had improvement of disc edema, but edema returned on discontinuation of Diamox 11/2016.  She had resolution of her disc edema 8/2017. Diamox stopped at that visit.     1 year ago, optic nerves not swollen, so we stopped bumex (q AM).  6 months ago we tapered her to 25 mg daily topamax (from BID).    She is doing well. No headaches. No vision changes. No double vision.     She does not have optic disc edema today.  Discontinue topamax.     She is also on plaquneil 200mg twice a day. Has not returned to [facility] since 2015 when started coming here. Has had OCT and G-Top visual field here but no 10-2, mac scan, or FAF. Needs plaquenil testing at f/u with [facility].     Follow up 3 months with retinal nerve fiber layer.  Follow up with [facility] for plaquenil testing.                  Attending Physician Attestation:  Complete documentation of historical and exam elements from today's encounter can be found in the full encounter summary report (not reduplicated in this progress note).  I personally obtained the chief complaint(s) and history of present illness.  I confirmed and edited as necessary the review of systems, past medical/surgical history, family history, social history, and examination findings as documented by others; and I examined the patient myself.  I personally reviewed the relevant tests, images, and reports as documented above.  I formulated and edited as necessary the assessment and plan and discussed the findings and management plan with the patient and family. - Ryan Carrillo M.D.  PGY-3, Ophthalmology

## 2019-04-18 NOTE — NURSING NOTE
Chief Complaint(s) and History of Present Illness(es)     Follow Up     In both eyes (Optic disc edema; IIH).  Since onset it is stable.  Associated symptoms include Negative for double vision and headache.              Comments     Patient states vision stable each eye.   No headaches or double vision.     topamax once a day.     DEMARCUS Burrows 4/18/2019 9:58 AM

## 2019-05-14 ENCOUNTER — TELEPHONE (OUTPATIENT)
Dept: OPHTHALMOLOGY | Facility: CLINIC | Age: 69
End: 2019-05-14

## 2019-05-14 DIAGNOSIS — G93.2 IIH (IDIOPATHIC INTRACRANIAL HYPERTENSION): Primary | ICD-10-CM

## 2019-05-14 DIAGNOSIS — G93.2 IIH (IDIOPATHIC INTRACRANIAL HYPERTENSION): ICD-10-CM

## 2019-05-14 RX ORDER — TOPIRAMATE 25 MG/1
25 TABLET, FILM COATED ORAL DAILY
Qty: 30 TABLET | Refills: 3 | Status: SHIPPED | OUTPATIENT
Start: 2019-05-14 | End: 2019-09-11

## 2019-05-14 NOTE — TELEPHONE ENCOUNTER
Patient discontinued topamax 25 mg at last visit 1 month ago, now with recurrent pressure sensation. OK to restart topamax 25 mg daily. Refills sent to pharmacy. Patient asked to call and return with any worsening vision or diplopia.    Juan Carrillo M.D.  PGY-3, Ophthalmology

## 2019-07-18 ENCOUNTER — OFFICE VISIT (OUTPATIENT)
Dept: OPHTHALMOLOGY | Facility: CLINIC | Age: 69
End: 2019-07-18
Attending: OPHTHALMOLOGY
Payer: MEDICARE

## 2019-07-18 DIAGNOSIS — G93.2 IDIOPATHIC INTRACRANIAL HYPERTENSION: ICD-10-CM

## 2019-07-18 DIAGNOSIS — Z79.899 LONG-TERM USE OF PLAQUENIL: Primary | ICD-10-CM

## 2019-07-18 DIAGNOSIS — G93.2 IDIOPATHIC INTRACRANIAL HYPERTENSION: Primary | ICD-10-CM

## 2019-07-18 PROCEDURE — 92133 CPTRZD OPH DX IMG PST SGM ON: CPT | Mod: ZF | Performed by: OPHTHALMOLOGY

## 2019-07-18 PROCEDURE — 92082 INTERMEDIATE VISUAL FIELD XM: CPT | Mod: ZF | Performed by: OPHTHALMOLOGY

## 2019-07-18 PROCEDURE — 92134 CPTRZ OPH DX IMG PST SGM RTA: CPT | Mod: ZF | Performed by: OPHTHALMOLOGY

## 2019-07-18 PROCEDURE — G0463 HOSPITAL OUTPT CLINIC VISIT: HCPCS | Mod: ZF

## 2019-07-18 RX ORDER — TOPIRAMATE 25 MG/1
25 TABLET, FILM COATED ORAL
COMMUNITY
Start: 2019-05-23

## 2019-07-18 RX ORDER — ALBUTEROL SULFATE 90 UG/1
AEROSOL, METERED RESPIRATORY (INHALATION)
Refills: 6 | COMMUNITY
Start: 2019-07-10

## 2019-07-18 RX ORDER — LANOLIN ALCOHOL/MO/W.PET/CERES
1000 CREAM (GRAM) TOPICAL
COMMUNITY
Start: 2019-05-22

## 2019-07-18 ASSESSMENT — SLIT LAMP EXAM - LIDS
COMMENTS: NORMAL
COMMENTS: NORMAL

## 2019-07-18 ASSESSMENT — CONF VISUAL FIELD
OS_NORMAL: 1
OD_NORMAL: 1
METHOD: COUNTING FINGERS

## 2019-07-18 ASSESSMENT — REFRACTION_MANIFEST
OS_CYLINDER: SPHERE
OD_CYLINDER: +0.50
OS_SPHERE: +0.75
OD_AXIS: 035
OD_SPHERE: +0.75

## 2019-07-18 ASSESSMENT — EXTERNAL EXAM - RIGHT EYE: OD_EXAM: NORMAL

## 2019-07-18 ASSESSMENT — VISUAL ACUITY
OS_PH_SC: 20/20
OD_SC: 20/20
OS_SC: 20/30
METHOD: SNELLEN - LINEAR

## 2019-07-18 ASSESSMENT — TONOMETRY
OS_IOP_MMHG: 19
IOP_METHOD: ICARE
OD_IOP_MMHG: 18

## 2019-07-18 ASSESSMENT — EXTERNAL EXAM - LEFT EYE: OS_EXAM: NORMAL

## 2019-07-18 NOTE — PROGRESS NOTES
Assessment & Plan     Yasmeen Mcdonald is a 69 year old female with the following diagnoses:   1. Long-term use of Plaquenil    2. 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 Here for 4 month f/u.     Diagnosed in 2015. Patient initially had improvement of disc edema, but edema returned on discontinuation of Diamox 11/2016.  She had resolution of her disc edema 8/2017. Diamox stopped at that visit.     In 2017, optic nerves not swollen, so we stopped bumex (q AM).  9 months ago we tapered her to 25 mg daily topamax (from BID).    She is doing well. She had a pressure sensation since last visit that she now attributes to her blood pressure being elevated secondary to neck issue. Using Topamax 25 mg daily. No vision changes. No double vision.     She does not have optic disc edema today.  Discontinue topamax. And follow up in 4 months to recheck nerves.       She is also on plaquenil 200mg twice a day for the last 10 years. Weighs 145#. No history of renal issue. Follows with Dr. Carola Loja for her SLE.     Visual acuity stable, intraocular pressure normal, pupils normal, eye movement normal, color plates normal. Optical coherence tomography retinal nerve fiber layer stable since October 2018. Visual field  reliable and normal. Optical coherence tomography macula with normal juxtafoveal outer retinal structures.     There is no evidence of plaquenil toxicity.       Plans to be seen at [facility] next year for Plaquenil screening.           Attending Physician Attestation:  Complete documentation of historical and exam elements from today's encounter can be found in the full encounter summary report (not reduplicated in this progress note).  I personally obtained the chief complaint(s) and history of present illness.  I confirmed and edited as necessary the review of systems, past medical/surgical history, family history,  social history, and examination findings as documented by others; and I examined the patient myself.  I personally reviewed the relevant tests, images, and reports as documented above.  I formulated and edited as necessary the assessment and plan and discussed the findings and management plan with the patient and family. - Ryan Crystal MD  Ophthalmology, PGY-5  Neuro-Ophthalmology Fellow

## 2019-07-18 NOTE — LETTER
2019         RE:  :  MRN: Yasmeen Mcdonald  1950  [id number]     Dear Dr. Loja:     Your patient, Yasmeen Mcdonald, returned for neuro-ophthalmic follow up. My assessment and plan are below.  For further details, please see my attached clinic note.       Assessment & Plan     Yasmeen Mcdonald is a 69 year old female with the following diagnoses:   1. Long-term use of Plaquenil    2. 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 Here for 4 month f/u.     Diagnosed in . Patient initially had improvement of disc edema, but edema returned on discontinuation of Diamox 2016.  She had resolution of her disc edema 2017. Diamox stopped at that visit.     In , optic nerves not swollen, so we stopped bumex (q AM).  9 months ago we tapered her to 25 mg daily topamax (from BID).    She is doing well. She had a pressure sensation since last visit that she now attributes to her blood pressure being elevated secondary to neck issue. Using Topamax 25 mg daily. No vision changes. No double vision.     She does not have optic disc edema today.  Discontinue topamax. And follow up in 4 months to recheck nerves.       She is also on plaquenil 200mg twice a day for the last 10 years. Weighs 145#. No history of renal issue. Follows with Dr. Carola Loja for her SLE.     Visual acuity stable, intraocular pressure normal, pupils normal, eye movement normal, color plates normal. Optical coherence tomography retinal nerve fiber layer stable since 2018. Visual field  reliable and normal. Optical coherence tomography macula with normal juxtafoveal outer retinal structures.     There is no evidence of plaquenil toxicity.       Plans to be seen at [facility] next year for Plaquenil screening.      Again, thank you for allowing me to participate in the care of your patient.      Sincerely,    Ryan Neville MD  Professor  Ophthalmology  Residency   Director of Neuro-Ophthalmology  [facility] Endowed Chair  Departments of Ophthalmology, Neurology, and Neurosurgery  [facility]   [address] [place]  [address]  T - [phone]   - [phone]  [email or web address]      CC: Chaparro Bryant MD  [facility]  [address]  [place] [address]  VIA In Basket     Carola Loja MD  [facility]  [address]  [address]  [place] [address]  VIA Facsimile: [phone]       DX = Idiopathic Intracranial Hypertension (IIH)    Addendum: The [facility] is now enrolling patients in the Surgical IIHTT which randomizes patients with Idiopathic Intracranial Hypertension and moderate to severe vision loss to one of the following regimens:    1.  Acetazolamide + diet   2.  Ventriculoperitoneal shunt     3.  Optic nerve sheath fenestration     We would appreciate referral of any newly diagnosed case of bilateral papilledema, ideally before any laboratory evaluation.  Please contact our  at [email or web address] or [phone].  Thank you!

## 2019-07-18 NOTE — NURSING NOTE
Chief Complaints and History of Present Illnesses   Patient presents with     Follow Up      Chief Complaint(s) and History of Present Illness(es)     Follow Up     Laterality: both eyes              Comments     3 month follow up of idiopathic intracranial hypertension.  Did not follow up with [facility] for 10-2.  Patient says vision right eye has become more blurred. Denies diplopia, headache, tinnitus. No weight gain or loss per patient.  Eye meds: topamax 25mg PO  DEMARCUS Mcarthur 7/18/2019 10:20 AM

## 2019-07-22 ENCOUNTER — TELEPHONE (OUTPATIENT)
Dept: CALL CENTER | Age: 69
End: 2019-07-22

## 2019-07-22 DIAGNOSIS — G44.89 OTHER HEADACHE SYNDROME: Primary | ICD-10-CM

## 2019-07-22 RX ORDER — TOPIRAMATE 25 MG/1
25 TABLET, FILM COATED ORAL DAILY
Qty: 7 TABLET | Refills: 0 | Status: SHIPPED | OUTPATIENT
Start: 2019-07-22

## 2019-07-22 RX ORDER — TOPIRAMATE 25 MG/1
25 TABLET, FILM COATED ORAL DAILY
Qty: 120 TABLET | Refills: 0 | Status: SHIPPED | OUTPATIENT
Start: 2019-07-22

## 2019-07-22 NOTE — TELEPHONE ENCOUNTER
Pt requesting 25 mg topamax for 4 months. Refill provided.    Ryan Crystal MD  Ophthalmology, PGY-5  Neuro-Ophthalmology Fellow

## 2019-07-22 NOTE — TELEPHONE ENCOUNTER
.  [facility]: Nurse Triage Note  SITUATION/BACKGROUND                                                      Yasmeen Mcdonald is a 69 year old female who calls to report having head pressure sensation after stopping her Topamax.   Seen in clinic on 7/18/19 by Dr. Neville with instructed to stop taking. No med on Friday, Saturday and Sunday morning.     Precip. factors:  Stopped taking Topamax  Associated symptoms:  Pressure in her head.   Improves/worsens symptoms:  Med taken on Sunday improved.  Pain scale (0-10)   0/10    Denies any vision changes or other S/S of protocol.     MEDICATIONS:   Taking medication(s) as prescribed? Started medication again on Sunday at 3 pm.  Taking over the counter medication(s?) N/A  Any barriers to taking medication(s) as prescribed?  No  Medication(s) improving/managing symptoms?  Yes    Allergies:   Allergies   Allergen Reactions     Sulfa Drugs Rash       ASSESSMENT    Yasmeen Mcdonald is a 69 year old female who calls to report having head pressure sensation after stopping her Topamax.     Seen in clinic on 7/18/19 by Dr. Neville and instructed to stop taking. No med on Friday, Saturday and Sunday morning.     Denies any vision changes or other S/S of protocol.     Patient reported that she has been taken off this med and started up again, several times.     Took pill Sunday at 3pm. And again, today at 9 am. She normally takes in the am.      This nurse reiterated the need for patient to notify the doctor with any changes in medication regimen.  In addition, medication should be taken directed.     Routed to Eye Clinic for a call back for evaluation and request for refill on Topamax (25Mg) - only enough for a week.       RECOMMENDATION/PLAN                                                      RECOMMENDED DISPOSITION:  Routed to Eye Clinic for review/evaluation/ call back. Home care instruction given per protocol.   Will comply with recommendation: Yes    If further  questions/concerns or if symptoms do not improve, worsen or new symptoms develop, call your PCP or [phone] to talk with the Resident on call, as soon as possible.    Guideline used: Eye problems  Telephone Triage Protocols for Nurses, Fifth Edition, Diane Arora RN

## 2019-07-22 NOTE — TELEPHONE ENCOUNTER
Pt requesting 1 week of Topamax for recurrent headaches after stopping Topamax after her appointment with Dr. Neville last week. 1 week of medication supplied to pt.     Ryan Crystal MD  Ophthalmology, PGY-5  Neuro-Ophthalmology Fellow

## 2019-08-23 ENCOUNTER — TELEPHONE (OUTPATIENT)
Dept: OPHTHALMOLOGY | Facility: CLINIC | Age: 69
End: 2019-08-23

## 2019-08-23 NOTE — TELEPHONE ENCOUNTER
No changes per Dr. Crystal in medication     Pt been using topamax 25 mg twice daily to help with the pressure in head-- not  Taking away    Pt states will need refill to get by until November appt    Note to Dr. Crystal to approve the 25 mg twice daily Rx-- previously ordered by Dr. Crystal 7-22-19 for 25 mg daily     Pt uses [facility] pharmacy in [place], [place]    Pt will need callback once complete  Pt aware to call for evaluation for any new vision changes, worsening head pain      Ludwig Bojorquez RN RN 10:34 AM 08/27/19    -------    Spoke to pt at 0844  No vision changes  Pressure behind eyes    Pressure in head-- aching type headache  Can effect hearing    Pt stopped topamax after last visit in July with Dr. Neville and after stopping 3 days later had headaches and soon had to increase to 2 tabs daily (25 mg 2/day)  Pressure still present-- noticed off and on and does not last for hours.    Pt had recent eye exam with local eye appt last week-- eyes looked good per pt (no note of optic nerve changes)    Note to Dr. Crystal/faciliator to assist in plan of care for medication    Ludwig Bojorquez RN RN 8:50 AM 08/23/19          [facility] Call Center    Phone Message    May a detailed message be left on voicemail: yes    Reason for Call: Symptoms or Concerns       Current symptom or concern: Yasmeen states that she is now taking two tablets of Topiramate a day and is still experiencing a lot of pressure in her head.  She states that she is not experiencing any pressure in her eyes, and she would not describe the pressure as a headache.  However, the pressure does seem to affect her ears.    Symptoms have been present for:  1 month(s)    Has patient previously been seen for this? Yes    By : Dr. Neville    Date: 7.18.19    Are there any new or worsening symptoms? Yes: Increasing pressure      Action Taken: Message routed to:  [facility] ([facility]): [facility]

## 2019-08-27 DIAGNOSIS — G93.2 IIH (IDIOPATHIC INTRACRANIAL HYPERTENSION): Primary | ICD-10-CM

## 2019-08-27 RX ORDER — TOPIRAMATE 25 MG/1
25 TABLET, FILM COATED ORAL 2 TIMES DAILY
Qty: 60 TABLET | Refills: 2 | Status: SHIPPED | OUTPATIENT
Start: 2019-08-27

## 2019-08-27 NOTE — TELEPHONE ENCOUNTER
Ordered topiramate 25 mg twice daily.    Ryan Crystal MD  Ophthalmology, PGY-5  Neuro-Ophthalmology Fellow

## 2020-09-15 ENCOUNTER — OFFICE VISIT (OUTPATIENT)
Dept: OPHTHALMOLOGY | Facility: CLINIC | Age: 70
End: 2020-09-15
Attending: OPHTHALMOLOGY
Payer: MEDICARE

## 2020-09-15 DIAGNOSIS — Z79.899 LONG-TERM USE OF PLAQUENIL: ICD-10-CM

## 2020-09-15 DIAGNOSIS — Z79.899 LONG-TERM USE OF PLAQUENIL: Primary | ICD-10-CM

## 2020-09-15 DIAGNOSIS — H47.10 OPTIC DISC EDEMA: Primary | ICD-10-CM

## 2020-09-15 PROCEDURE — 92133 CPTRZD OPH DX IMG PST SGM ON: CPT | Mod: ZF | Performed by: OPHTHALMOLOGY

## 2020-09-15 PROCEDURE — 92082 INTERMEDIATE VISUAL FIELD XM: CPT | Mod: ZF | Performed by: OPHTHALMOLOGY

## 2020-09-15 PROCEDURE — 92134 CPTRZ OPH DX IMG PST SGM RTA: CPT | Mod: ZF | Performed by: OPHTHALMOLOGY

## 2020-09-15 PROCEDURE — G0463 HOSPITAL OUTPT CLINIC VISIT: HCPCS | Mod: ZF | Performed by: TECHNICIAN/TECHNOLOGIST

## 2020-09-15 RX ORDER — ACETAZOLAMIDE 250 MG/1
250 TABLET ORAL 2 TIMES DAILY
Qty: 60 TABLET | Refills: 11 | Status: SHIPPED | OUTPATIENT
Start: 2020-09-15 | End: 2021-07-01

## 2020-09-15 ASSESSMENT — CUP TO DISC RATIO
OS_RATIO: 0.1
OD_RATIO: 0.1

## 2020-09-15 ASSESSMENT — CONF VISUAL FIELD
METHOD: COUNTING FINGERS
OD_NORMAL: 1
OS_NORMAL: 1

## 2020-09-15 ASSESSMENT — VISUAL ACUITY
OS_SC+: -1
OD_SC: 20/20
OS_SC: 20/30
METHOD: SNELLEN - LINEAR
OS_PH_SC+: +2
OS_PH_SC: 20/30
OD_SC+: -1

## 2020-09-15 ASSESSMENT — EXTERNAL EXAM - RIGHT EYE: OD_EXAM: NORMAL

## 2020-09-15 ASSESSMENT — TONOMETRY
OS_IOP_MMHG: 17
IOP_METHOD: ICARE
OD_IOP_MMHG: 18

## 2020-09-15 ASSESSMENT — SLIT LAMP EXAM - LIDS
COMMENTS: NORMAL
COMMENTS: NORMAL

## 2020-09-15 ASSESSMENT — EXTERNAL EXAM - LEFT EYE: OS_EXAM: NORMAL

## 2020-09-15 NOTE — LETTER
9/15/2020         RE:  :  MRN: Yasmeen Mcdonald  1950  [id number]     Dear Dr. Bryant,    Your patient, Yasmeen Mcdonald, returned for neuro-ophthalmic follow up. My assessment and plan are below.  For further details, please see my attached clinic note.        Assessment & Plan     Yasmeen Mcdonald is a 70 year old female with the following diagnoses:   1. 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 Here for 4 month f/u.      Diagnosed in . Patient initially had improvement of disc edema, but edema returned on discontinuation of Diamox 2016.  She had resolution of her disc edema 2017. Diamox stopped at that visit.  Last visit was in 2019, she did not have any edema do topamax was discontinued     Currently she is off topamax and diamox for about a year. She is complaining of pressure like headaches that started 2 months ago and she says that they are similar to the headaches with IIH. She denies any blurred vision but the several episodes of blacking out in the right eye when she wakes up in the morning. It resolves when she sits down (takes around 10 seconds). She reports pulsatile tinnitus in the left ear that started few months ago and has been worsening since then. She denies any nausea and vomiting or TVOs . bending forward causes dizziness but TVOs. She also mentioned that she also has worsening of hearing loss, when she plung her left ear clear fluid comes out    She is still taking plaquenil 200mg twice a day for lupus (at least 10 years). She also takes multivitamins that contain vit A    Her visual acuity is 20/20 in the right eye and 20/30 in the left eye (was 20/20). No APD both eyes. IOP is 18 in the right eye and 17 in the left eye. motility is full in  Both eyes. Color plates are full in both eyes. VF are full in both eyes    My impression is she has edema of the right optic nerve. Given that she is having clear  fluid coming out from her left ear, this could suggesting a cerebrospinal fluid leak.  However, it is unclear that she has a recurrence of Idiopathic Intracranial Hypertension (IIH) since only one optic nerve is swollen, and I would do MRI orbit with and with out contrast. Restart diamox at 250 twice a day (she is only 140 lbs).  Follow up 6-8 weeks sooner as needed for worsening symptoms.      She does not have plaquenil toxicity.      Again, thank you for allowing me to participate in the care of your patient.      Sincerely,    Ryan Neville MD  Professor  Ophthalmology Residency   Director of Neuro-Ophthalmology  [facility] Chair  Departments of Ophthalmology, Neurology, and Neurosurgery  [facility] [address] [place]  [address]  T - [phone]  F - [phone]  [email or web address]      CC: Chaparro Bryant MD  [facility]  [address]  [place] [address]  VIA In Basket     Carola Loja MD  [facility]  [address]  [place] [address]  VIA In Basket    DX = recurrent unilateral optic disc edema, Idiopathic Intracranial Hypertension (IIH)

## 2020-09-15 NOTE — NURSING NOTE
"Chief Complaint(s) and History of Present Illness(es)     Follow Up     In both eyes (Long-term use of Plaquenil; Idiopathic intracranial hypertension).  Associated symptoms include headache and eye pain.  Negative for double vision.              Comments     Patient states blackout vision in the right eye, last for a few seconds until she sits upright.   Patient states she saw her regular ophthalmologist last month and notes that there are pressure on the RIGHT eye optic nerve.   +reports \"thumping\" headache and pressure all over head. Patient states she does not usually get headaches.   +sharp eye pain occasionally    DEMARCUS Burrows 9/15/2020 9:55 AM                "

## 2020-09-15 NOTE — PROGRESS NOTES
Assessment & Plan     Yasmeen Mcdonald is a 70 year old female with the following diagnoses:   1. 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 Here for 4 month f/u.      Diagnosed in 2015. Patient initially had improvement of disc edema, but edema returned on discontinuation of Diamox 11/2016.  She had resolution of her disc edema 8/2017. Diamox stopped at that visit.  Last visit was in 7/18/2019, she did not have any edema do topamax was discontinued     Currently she is off topamax and diamox for about a year. She is complaining of pressure like headaches that started 2 months ago and she says that they are similar to the headaches with IIH. She denies any blurred vision but the several episodes of blacking out in the right eye when she wakes up in the morning. It resolves when she sits down (takes around 10 seconds). She reports pulsatile tinnitus in the left ear that started few months ago and has been worsening since then. She denies any nausea and vomiting or TVOs . bending forward causes dizziness but TVOs. She also mentioned that she also has worsening of hearing loss, when she plung her left ear clear fluid comes out    She is still taking plaquenil 200mg twice a day for lupus (at least 10 years). She also takes multivitamins that contain vit A    Her visual acuity is 20/20 in the right eye and 20/30 in the left eye (was 20/20). No APD both eyes. IOP is 18 in the right eye and 17 in the left eye. motility is full in  Both eyes. Color plates are full in both eyes. VF are full in both eyes    My impression is she has edema of the right optic nerve. Given that she is having clear fluid coming out from her left ear, this could suggesting a cerebrospinal fluid leak.  However, it is unclear that she has a recurrence of Idiopathic Intracranial Hypertension (IIH) since only one optic nerve is swollen, and I would do MRI orbit with  and with out contrast. Restart diamox at 250 twice a day (she is only 140 lbs).  Follow up 6-8 weeks sooner as needed for worsening symptoms.      She does not have plaquenil toxicity.            Attending Physician Attestation:  Complete documentation of historical and exam elements from today's encounter can be found in the full encounter summary report (not reduplicated in this progress note).  I personally obtained the chief complaint(s) and history of present illness.  I confirmed and edited as necessary the review of systems, past medical/surgical history, family history, social history, and examination findings as documented by others; and I examined the patient myself.  I personally reviewed the relevant tests, images, and reports as documented above.  I formulated and edited as necessary the assessment and plan and discussed the findings and management plan with the patient and family. I personally reviewed the ophthalmic test(s) associated with this encounter, agree with the interpretation(s) as documented by the resident/fellow, and have edited the corresponding report(s) as necessary.  - Ryan Jackson MD  Neuro-ophthalmology fellow  [facility]

## 2020-09-15 NOTE — Clinical Note
9/15/2020       RE: Yasmeen Mcdonald  [address]  [address]  [place] [address]     Dear Colleague,    Thank you for referring your patient, Yasmeen Mcdonald, to the EYE CLINIC at [facility]. Please see a copy of my visit note below.           Assessment & Plan     Yasmeen Mcdonald is a 70 year old female with the following diagnoses:   1. 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 Here for 4 month f/u.      Diagnosed in 2015. Patient initially had improvement of disc edema, but edema returned on discontinuation of Diamox 11/2016.  She had resolution of her disc edema 8/2017. Diamox stopped at that visit.  Last visit was in 7/18/2019, she did not have any edema do topamax was discontinued     Currently she is off topamax and diamox for about a year. She is complaining of pressure like headaches that started 2 months ago and she says that they are similar to the headaches with IIH. She denies any blurred vision but the several episodes of blacking out in the right eye when she wakes up in the morning. It resolves when she sits down (takes around 10 seconds). She reports pulsatile tinnitus in the left ear that started few months ago and has been worsening since then. She denies any nausea and vomiting or TVOs . bending forward causes dizziness but TVOs. She also mentioned that she also has worsening of hearing loss, when she plung her left ear clear fluid comes out    She is still taking plaquenil 200mg twice a day for lupus (at least 10 years). She also takes multivitamins that contain vit A    Her visual acuity is 20/20 in the right eye and 20/30 in the left eye (was 20/20). No APD both eyes. IOP is 18 in the right eye and 17 in the left eye. motility is full in  Both eyes. Color plates are full in both eyes. VF are full in both eyes    My impression is she has edema of the right optic nerve. Given  that she is having clear fluid coming out from her left ear, would do MRI orbit with and with out contrast. Restart topamax          Attending Physician Attestation:  Complete documentation of historical and exam elements from today's encounter can be found in the full encounter summary report (not reduplicated in this progress note).  I personally obtained the chief complaint(s) and history of present illness.  I confirmed and edited as necessary the review of systems, past medical/surgical history, family history, social history, and examination findings as documented by others; and I examined the patient myself.  I personally reviewed the relevant tests, images, and reports as documented above.  I formulated and edited as necessary the assessment and plan and discussed the findings and management plan with the patient and family. I personally reviewed the ophthalmic test(s) associated with this encounter, agree with the interpretation(s) as documented by the resident/fellow, and have edited the corresponding report(s) as necessary.  - Ryan Jackson MD  Neuro-ophthalmology fellow  [facility]             Assessment & Plan     Yasmeen Mcdonald is a 70 year old female with the following diagnoses:   1. 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 Here for 4 month f/u.      Diagnosed in 2015. Patient initially had improvement of disc edema, but edema returned on discontinuation of Diamox 11/2016.  She had resolution of her disc edema 8/2017. Diamox stopped at that visit.  Last visit was in 7/18/2019, she did not have any edema do topamax was discontinued     Currently she is off topamax and diamox for about a year. She is complaining of pressure like headaches that started 2 months ago and she says that they are similar to the headaches with IIH. She denies any blurred vision but the several episodes of  blacking out in the right eye when she wakes up in the morning. It resolves when she sits down (takes around 10 seconds). She reports pulsatile tinnitus in the left ear that started few months ago and has been worsening since then. She denies any nausea and vomiting or TVOs . bending forward causes dizziness but TVOs. She also mentioned that she also has worsening of hearing loss, when she plung her left ear clear fluid comes out    She is still taking plaquenil 200mg twice a day for lupus (at least 10 years). She also takes multivitamins that contain vit A    Her visual acuity is 20/20 in the right eye and 20/30 in the left eye (was 20/20). No APD both eyes. IOP is 18 in the right eye and 17 in the left eye. motility is full in  Both eyes. Color plates are full in both eyes. VF are full in both eyes    My impression is she has edema of the right optic nerve. Given that she is having clear fluid coming out from her left ear, this could suggesting a cerebrospinal fluid leak.  However, it is unclear that she has a recurrence of Idiopathic Intracranial Hypertension (IIH) since only one optic nerve is swollen, and I would do MRI orbit with and with out contrast. Restart diamox at 250 twice a day (she is only 140 lbs).  Follow up 6-8 weeks sooner as needed for worsening symptoms.      She does not have plaquenil toxicity.            Attending Physician Attestation:  Complete documentation of historical and exam elements from today's encounter can be found in the full encounter summary report (not reduplicated in this progress note).  I personally obtained the chief complaint(s) and history of present illness.  I confirmed and edited as necessary the review of systems, past medical/surgical history, family history, social history, and examination findings as documented by others; and I examined the patient myself.  I personally reviewed the relevant tests, images, and reports as documented above.  I formulated and edited  as necessary the assessment and plan and discussed the findings and management plan with the patient and family. I personally reviewed the ophthalmic test(s) associated with this encounter, agree with the interpretation(s) as documented by the resident/fellow, and have edited the corresponding report(s) as necessary.  - Ryan Jackson MD  Neuro-ophthalmology fellow  [facility]      Again, thank you for allowing me to participate in the care of your patient.      Sincerely,    Ryan Neville MD

## 2020-09-24 ENCOUNTER — TRANSFERRED RECORDS (OUTPATIENT)
Dept: HEALTH INFORMATION MANAGEMENT | Facility: CLINIC | Age: 70
End: 2020-09-24

## 2020-09-30 ENCOUNTER — TELEPHONE (OUTPATIENT)
Dept: OPHTHALMOLOGY | Facility: CLINIC | Age: 70
End: 2020-09-30

## 2021-07-01 DIAGNOSIS — Z79.899 LONG-TERM USE OF PLAQUENIL: ICD-10-CM

## 2021-07-01 DIAGNOSIS — H47.10 OPTIC DISC EDEMA: ICD-10-CM

## 2021-07-01 RX ORDER — ACETAZOLAMIDE 250 MG/1
250 TABLET ORAL 2 TIMES DAILY
Qty: 30 TABLET | Refills: 0 | Status: SHIPPED | OUTPATIENT
Start: 2021-07-01 | End: 2021-07-12

## 2021-07-05 ENCOUNTER — TELEPHONE (OUTPATIENT)
Dept: OPHTHALMOLOGY | Facility: CLINIC | Age: 71
End: 2021-07-05

## 2021-07-05 NOTE — TELEPHONE ENCOUNTER
Spoke to patient.  We received refill request for acetazolamide.  Patient is overdue for follow up.  We had pushed it out due to COVID previously.  Patient is ready to schedule but needs to schedule with her son about time.  She will call me with times.     Melody Meier on 7/5/2021 at 3:43 PM

## 2021-07-12 RX ORDER — ACETAZOLAMIDE 250 MG/1
250 TABLET ORAL 2 TIMES DAILY
Qty: 60 TABLET | Refills: 2 | Status: SHIPPED | OUTPATIENT
Start: 2021-07-12

## 2021-07-12 NOTE — TELEPHONE ENCOUNTER
Spoke to patient and assisted with scheduling.  Patient should be seen 9/9 as scheduled prior to any further refills.     Melody Meier on 7/12/2021 at 4:56 PM

## 2021-09-09 ENCOUNTER — OFFICE VISIT (OUTPATIENT)
Dept: OPHTHALMOLOGY | Facility: CLINIC | Age: 71
End: 2021-09-09
Attending: OPHTHALMOLOGY
Payer: MEDICARE

## 2021-09-09 DIAGNOSIS — H47.10 OPTIC DISC EDEMA: Primary | ICD-10-CM

## 2021-09-09 DIAGNOSIS — Z79.899 LONG-TERM USE OF PLAQUENIL: Primary | ICD-10-CM

## 2021-09-09 DIAGNOSIS — Z79.899 LONG-TERM USE OF PLAQUENIL: ICD-10-CM

## 2021-09-09 PROCEDURE — G0463 HOSPITAL OUTPT CLINIC VISIT: HCPCS

## 2021-09-09 PROCEDURE — 92014 COMPRE OPH EXAM EST PT 1/>: CPT | Mod: GC | Performed by: OPHTHALMOLOGY

## 2021-09-09 PROCEDURE — 92082 INTERMEDIATE VISUAL FIELD XM: CPT | Performed by: OPHTHALMOLOGY

## 2021-09-09 PROCEDURE — 99207 FUNDUS AUTOFLUORESCENCE IMAGE (FAF) OU (BOTH EYES): CPT | Performed by: OPHTHALMOLOGY

## 2021-09-09 PROCEDURE — 92134 CPTRZ OPH DX IMG PST SGM RTA: CPT | Performed by: OPHTHALMOLOGY

## 2021-09-09 PROCEDURE — 92250 FUNDUS PHOTOGRAPHY W/I&R: CPT | Performed by: OPHTHALMOLOGY

## 2021-09-09 RX ORDER — ACETAZOLAMIDE 250 MG/1
250 TABLET ORAL 2 TIMES DAILY
Qty: 180 TABLET | Refills: 3 | Status: SHIPPED | OUTPATIENT
Start: 2021-09-09 | End: 2023-09-21

## 2021-09-09 ASSESSMENT — CUP TO DISC RATIO
OS_RATIO: 0.1
OD_RATIO: 0.1

## 2021-09-09 ASSESSMENT — SLIT LAMP EXAM - LIDS
COMMENTS: NORMAL
COMMENTS: NORMAL

## 2021-09-09 ASSESSMENT — EXTERNAL EXAM - LEFT EYE: OS_EXAM: NORMAL

## 2021-09-09 ASSESSMENT — EXTERNAL EXAM - RIGHT EYE: OD_EXAM: NORMAL

## 2021-09-09 ASSESSMENT — CONF VISUAL FIELD
OD_NORMAL: 1
OS_NORMAL: 1

## 2021-09-09 ASSESSMENT — TONOMETRY
OD_IOP_MMHG: 12
OS_IOP_MMHG: 10
IOP_METHOD: ICARE

## 2021-09-09 ASSESSMENT — VISUAL ACUITY
OS_SC: 20/20
OD_SC: 20/30
METHOD: SNELLEN - LINEAR

## 2021-09-09 NOTE — NURSING NOTE
Chief Complaints and History of Present Illnesses   Patient presents with     Blurred Vision Follow-Up     Chief Complaint(s) and History of Present Illness(es)     Blurred Vision Follow-Up               Comments     Yasmeen Mcdonald is a 71 year old female with the following diagnoses:   1. Long-term use of Plaquenil   2. 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    No significant change in vision since the last visit.   Patient recently diagnosed with lung cancer. Has an MRI scheduled for next Monday to r/o mets.       Lidia TRACY 9:08 AM September 9, 2021

## 2021-09-09 NOTE — PROGRESS NOTES
"       Assessment & Plan     Yasmeen Mcdonald is a 71 year old female with the following diagnoses:   1. 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 She also has a hsitory of lupus (at least 10 years), currently taking plaquenil 200 mg BID. She also takes multivitamins that contain vit A.     Diagnosed in 2015. Patient initially had improvement of disc edema, but edema returned on discontinuation of Diamox 11/2016.  She had resolution of her disc edema 8/2017. Diamox stopped at that visit. At her last visit in 09/2020, she had right optic disc swelling. MRI orbits was obtained and she was started on diamox 250 mg BID.       MRI orbits (9/24/2020):  -Normal orbits and visual pathway imaging  -Stable partially empty sella  -No acute infarction or intracranial abnormaltiy  -Chronic microvascular changes    Since her last visit, she notes mild decline in bilateral vision, worse in the right eye. She denies recent TVOs, double vision, headaches, and nausea/vomiting. She notes that she has a long-standing history (multiple years) of \"heart-beat\" noise in her left ear; this stops when she turns her head. She describes this noise as more of a \"thumping noise\" than \"whooshing.\" She is on diamox 250 mg BID, tolerating this medication well.    She was diagnosed with non-small cell lung cancer, likely stage I, on 7/28/21. She has a brain MRI scheduled for 9/13, after which she notes decision will be made regarding possible left lower lobectomy.     Her visual acuity is 20/30 in the right eye (was 20/20) and 20/20 in the left eye. No APD both eyes. IOP is normal. Motility is full in both eyes. Color plates are full in both eyes. VF are full in both eyes.    OCT RNFL:  No plaquenil toxicity     My impression is she has edema of the right optic nerve. This is much better than last visit.  Continue present management on diamox and follow up in 1 " year.  Patient is on plaquenil.  There is no evidence of plaquenil toxicity.              Attending Physician Attestation:  Complete documentation of historical and exam elements from today's encounter can be found in the full encounter summary report (not reduplicated in this progress note).  I personally obtained the chief complaint(s) and history of present illness.  I confirmed and edited as necessary the review of systems, past medical/surgical history, family history, social history, and examination findings as documented by others; and I examined the patient myself.  I personally reviewed the relevant tests, images, and reports as documented above.  I formulated and edited as necessary the assessment and plan and discussed the findings and management plan with the patient and family. I personally reviewed the ophthalmic test(s) associated with this encounter, agree with the interpretation(s) as documented by the resident/fellow, and have edited the corresponding report(s) as necessary.  - Ryan Steven MD  Ophthalmology Resident, PGY-3

## 2021-09-09 NOTE — LETTER
"2021         RE:  :  MRN: Yasmeen Mcdonald  1950  [id number]     Dear Dr. Santamaria:     Your patient, Yasmeen Mcdonald, returned for neuro-ophthalmic follow up. My assessment and plan are below.  For further details, please see my attached clinic note.      Assessment & Plan     Yasmeen Mcdonald is a 71 year old female with the following diagnoses:   1. 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 She also has a hsitory of lupus (at least 10 years), currently taking plaquenil 200 mg BID. She also takes multivitamins that contain vit A.     Diagnosed in . Patient initially had improvement of disc edema, but edema returned on discontinuation of Diamox 2016.  She had resolution of her disc edema 2017. Diamox stopped at that visit. At her last visit in 2020, she had right optic disc swelling. MRI orbits was obtained and she was started on diamox 250 mg BID.       MRI orbits (2020):  -Normal orbits and visual pathway imaging  -Stable partially empty sella  -No acute infarction or intracranial abnormaltiy  -Chronic microvascular changes    Since her last visit, she notes mild decline in bilateral vision, worse in the right eye. She denies recent TVOs, double vision, headaches, and nausea/vomiting. She notes that she has a long-standing history (multiple years) of \"heart-beat\" noise in her left ear; this stops when she turns her head. She describes this noise as more of a \"thumping noise\" than \"whooshing.\" She is on diamox 250 mg BID, tolerating this medication well.    She was diagnosed with non-small cell lung cancer, likely stage I, on 21. She has a brain MRI scheduled for , after which she notes decision will be made regarding possible left lower lobectomy.     Her visual acuity is 20/30 in the right eye (was 20/20) and 20/20 in the left eye. No APD both eyes. IOP is normal. Motility is full in both eyes. Color " plates are full in both eyes. VF are full in both eyes.    OCT RNFL:  No plaquenil toxicity     My impression is she has edema of the right optic nerve. This is much better than last visit.  Continue present management on diamox and follow up in 1 year.  Patient is on plaquenil.  There is no evidence of plaquenil toxicity.        Again, thank you for allowing me to participate in the care of your patient.      Sincerely,    Ryan Neville MD  Professor  Ophthalmology Residency   Director of Neuro-Ophthalmology  [facility] Chair  Departments of Ophthalmology, Neurology, and Neurosurgery  [facility] [address]  [address] [place] [address]  [address]  T - [phone]  F - [phone]  [email or web address]      CC: Chaparro Bryant MD  [facility]  [address]  [place] [address]  Via In Basket     Lacie Santamaria MD  [facility]  [address]  [place] [address]  Via In Basket

## 2021-12-10 ENCOUNTER — TRANSFERRED RECORDS (OUTPATIENT)
Dept: HEALTH INFORMATION MANAGEMENT | Facility: CLINIC | Age: 71
End: 2021-12-10
Payer: MEDICARE

## 2022-09-15 ENCOUNTER — OFFICE VISIT (OUTPATIENT)
Dept: OPHTHALMOLOGY | Facility: CLINIC | Age: 72
End: 2022-09-15
Attending: OPHTHALMOLOGY
Payer: MEDICARE

## 2022-09-15 DIAGNOSIS — H47.10 EDEMA OF OPTIC DISC OF RIGHT EYE: ICD-10-CM

## 2022-09-15 DIAGNOSIS — Z79.899 LONG-TERM USE OF PLAQUENIL: ICD-10-CM

## 2022-09-15 DIAGNOSIS — H53.40 VISUAL FIELD DEFECT: ICD-10-CM

## 2022-09-15 DIAGNOSIS — G93.2 IDIOPATHIC INTRACRANIAL HYPERTENSION: Primary | ICD-10-CM

## 2022-09-15 DIAGNOSIS — H53.40 VISUAL FIELD DEFECT: Primary | ICD-10-CM

## 2022-09-15 PROCEDURE — 92133 CPTRZD OPH DX IMG PST SGM ON: CPT | Performed by: OPHTHALMOLOGY

## 2022-09-15 PROCEDURE — 92082 INTERMEDIATE VISUAL FIELD XM: CPT | Performed by: OPHTHALMOLOGY

## 2022-09-15 PROCEDURE — 92014 COMPRE OPH EXAM EST PT 1/>: CPT | Performed by: OPHTHALMOLOGY

## 2022-09-15 PROCEDURE — G0463 HOSPITAL OUTPT CLINIC VISIT: HCPCS | Mod: 25

## 2022-09-15 RX ORDER — ACETAZOLAMIDE 250 MG/1
250 TABLET ORAL 2 TIMES DAILY
Qty: 180 TABLET | Refills: 3 | Status: SHIPPED | OUTPATIENT
Start: 2022-09-15 | End: 2023-09-06

## 2022-09-15 ASSESSMENT — VISUAL ACUITY
OD_SC: 20/30
OD_PH_SC+: -3
METHOD: SNELLEN - LINEAR
OS_PH_SC+: -2
OS_SC+: -2
OD_SC+: -2
OS_PH_SC: 20/20
OS_SC: 20/30
OD_PH_SC: 20/25

## 2022-09-15 ASSESSMENT — CUP TO DISC RATIO
OD_RATIO: 0.1
OS_RATIO: 0.1

## 2022-09-15 ASSESSMENT — TONOMETRY
IOP_METHOD: TONOPEN
OS_IOP_MMHG: 19
OD_IOP_MMHG: 19

## 2022-09-15 ASSESSMENT — EXTERNAL EXAM - LEFT EYE: OS_EXAM: NORMAL

## 2022-09-15 ASSESSMENT — CONF VISUAL FIELD
OS_NORMAL: 1
METHOD: COUNTING FINGERS

## 2022-09-15 ASSESSMENT — SLIT LAMP EXAM - LIDS
COMMENTS: NORMAL
COMMENTS: NORMAL

## 2022-09-15 ASSESSMENT — EXTERNAL EXAM - RIGHT EYE: OD_EXAM: NORMAL

## 2022-09-15 NOTE — Clinical Note
9/15/2022       RE: Yasmeen Mcdonald  [address]  [address]  [place] [facility] [address]     Dear Colleague,    Thank you for referring your patient, Yasmeen Mcdonald, to the [facility] E[facility]. Please see a copy of my visit note below.           Assessment & Plan     Yasmeen Mcdonald is a 71 year old female with the following diagnoses:   1. Visual field defect    2. 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   Diagnosed in 2015. Patient initially had improvement of disc edema, but edema returned on discontinuation of Diamox 11/2016.  She had resolution of her disc edema 8/2017. Diamox stopped at that visit. In 09/2020 she had recurrence of right optic disc swelling.  She also takes vitamins that contain vitamin A.  At the time of last visit her optic disc edema was much improved.  At last visit, it was recommended continuing Diamox 250 mg twice a day.     She also has a history of lupus (at least 11 years) and currently taking Plaquenil 200 mg twice a day.  No evidence of plaquenil toxicity at time of last exam.     She also has a diagnosis of non-small cell lung cancer since last fall. 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 PET scan demonstrated no spread from the primary tumor.     MRI orbits (9/24/2020):  -Normal orbits and visual pathway imaging  -Stable partially empty sella  -No acute infarction or intracranial abnormaltiy  -Chronic microvascular changes    MRI Brain W & W/O Contrast (9/13/2021):  IMPRESSION:   1. No evidence for intracranial metastatic disease or acute intracranial abnormality.   2. Few punctate T2 FLAIR hyperintensities in the supratentorial white matter are nonspecific, though typical for sequelae of minimal chronic microvascular ischemic  "changes or migraine headaches.     PET Scan 8/3/2022:  No abnormal tracer uptake.    Since her last visit, she notices intermittent pulsatile tinnitus described as a heartbeat in her ears. She notes that she has a long-standing history (multiple years) of \"heart-beat\" noise in her left ear; this stops when she turns her head. She describes this noise as more of a \"thumping noise\" than \"whooshing.\"  She notes mild decline in bilateral vision, worse in the right eye attributed to increasing age. She has some dry eye. She denies recent TVOs, double vision, headaches, and nausea/vomiting. She is on diamox 250 mg BID, tolerating this medication well.    She was diagnosed with non-small cell lung cancer, likely stage I, on 7/28/21. She has a brain MRI scheduled for 9/13, after which she notes decision will be made regarding possible left lower lobectomy.     Her visual acuity is 20/30 in the right eye (stable) and 20/30 -2 in the left eye (was 20/20). No APD both eyes. IOP is normal. Motility is full in both eyes. Color plates are full in both eyes. VF are full in both eyes.    OCT RNFL:  No plaquenil toxicity. Stable retinal nerve fiber layer thickness compared to 2018 with no active edema in the right eye. Stable retinal nerve fiber layer thickness in the left eye consistent will all prior exams.    GTOP: Reliable. Visual fields full in both eyes and stable from prior exams.    My impression is she has stable resolved of the right optic nerve. Continue present management on diamox and follow up in 1 year.  Patient is on plaquenil.  There is no evidence of plaquenil toxicity.      Rheumatologist Dr. Santamaria from [facility] in [place], [place] (phone [phone], address [address] [address]) requests eye exam note be sent to verify lack of plaquenil toxicity.         @[email or web address]    Kayley Lucero MS4             Assessment & Plan     Yasmeen Mcdonald is a 72 year old female with the following diagnoses:   1. " "Idiopathic intracranial hypertension    2. Visual field defect    3. 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   Diagnosed in 2015. Patient initially had improvement of disc edema, but edema returned on discontinuation of Diamox 11/2016.  She had resolution of her disc edema 8/2017. Diamox stopped at that visit. In 09/2020 she had recurrence of right optic disc swelling.  She also takes vitamins that contain vitamin A.  At the time of last visit her optic disc edema was much improved.  At last visit, it was recommended continuing Diamox 250 mg twice a day.     She also has a history of lupus (at least 11 years) and currently taking Plaquenil 200 mg twice a day.  No evidence of plaquenil toxicity at time of last exam.     She also has a diagnosis of non-small cell lung cancer since last fall. 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 PET scan demonstrated no spread from the primary tumor.     MRI orbits (9/24/2020):  -Normal orbits and visual pathway imaging  -Stable partially empty sella  -No acute infarction or intracranial abnormaltiy  -Chronic microvascular changes    MRI Brain W & W/O Contrast (9/13/2021):  IMPRESSION:   1. No evidence for intracranial metastatic disease or acute intracranial abnormality.   2. Few punctate T2 FLAIR hyperintensities in the supratentorial white matter are nonspecific, though typical for sequelae of minimal chronic microvascular ischemic changes or migraine headaches.     PET Scan 8/3/2022:  No abnormal tracer uptake.    Since her last visit, she notices intermittent pulsatile tinnitus described as a heartbeat in her ears. She notes that she has a long-standing history (multiple years) of \"heart-beat\" noise in her left ear; this stops when she turns her head. She describes this " "noise as more of a \"thumping noise\" than \"whooshing.\"  She notes mild decline in bilateral vision, worse in the right eye attributed to increasing age. She has some dry eye. She denies recent TVOs, double vision, headaches, and nausea/vomiting. She is on diamox 250 mg BID, tolerating this medication well.    Her weight decreased to 112 following her lobectomy and returned to her normal of 120s-130s since.     She was diagnosed with non-small cell lung cancer, likely stage I, on 7/28/21. She has a brain MRI scheduled for 9/13, after which she notes decision will be made regarding possible left lower lobectomy.     Her visual acuity is 20/30 in the right eye (stable) and 20/30 -2 in the left eye (was 20/20). No APD both eyes. IOP is normal. Motility is full in both eyes. Color plates are full in both eyes. VF are full in both eyes.    OCT RNFL:  No plaquenil toxicity. Stable retinal nerve fiber layer thickness compared to 2018 with no active edema in the right eye. Stable retinal nerve fiber layer thickness in the left eye consistent will all prior exams.    GTOP: Reliable. Visual fields full in both eyes and stable from prior exams.    My impression is she has stable resolved of the right optic nerve. Continue present management on diamox and follow up in 1 year.  Patient is on plaquenil.  There is no evidence of plaquenil toxicity.      Rheumatologist Dr. Santamaria from [facility] in [place], [place] (phone [phone], address [address] [address]) requests eye exam note be sent to verify lack of plaquenil toxicity.          Attending Physician Attestation:  Complete documentation of historical and exam elements from today's encounter can be found in the full encounter summary report (not reduplicated in this progress note).  I personally obtained the chief complaint(s) and history of present illness.  I confirmed and edited as necessary the review of systems, past medical/surgical history, " family history, social history, and examination findings as documented by others; and I examined the patient myself.  I personally reviewed the relevant tests, images, and reports as documented above.  I formulated and edited as necessary the assessment and plan and discussed the findings and management plan with the patient and family. I personally reviewed the ophthalmic test(s) associated with this encounter, agree with the interpretation(s) as documented by the resident/fellow, and have edited the corresponding report(s) as necessary.  - Ryan Lucero, MS4        Again, thank you for allowing me to participate in the care of your patient.      Sincerely,    Ryan Neville MD

## 2022-09-15 NOTE — PROGRESS NOTES
Assessment & Plan     Yasmeen Mcdonald is a 72 year old female with the following diagnoses:   1. Idiopathic intracranial hypertension    2. Visual field defect    3. 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   Diagnosed in 2015. Patient initially had improvement of disc edema, but edema returned on discontinuation of Diamox 11/2016.  She had resolution of her disc edema 8/2017. Diamox stopped at that visit. In 09/2020 she had recurrence of right optic disc swelling.  She also takes vitamins that contain vitamin A.  At the time of last visit her optic disc edema was much improved.  At last visit, it was recommended continuing Diamox 250 mg twice a day.     She also has a history of lupus (at least 11 years) and currently taking Plaquenil 200 mg twice a day.  No evidence of plaquenil toxicity at time of last exam.     She also has a diagnosis of non-small cell lung cancer since last fall. 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 PET scan demonstrated no spread from the primary tumor.     MRI orbits (9/24/2020):  -Normal orbits and visual pathway imaging  -Stable partially empty sella  -No acute infarction or intracranial abnormaltiy  -Chronic microvascular changes    MRI Brain W & W/O Contrast (9/13/2021):  IMPRESSION:   1. No evidence for intracranial metastatic disease or acute intracranial abnormality.   2. Few punctate T2 FLAIR hyperintensities in the supratentorial white matter are nonspecific, though typical for sequelae of minimal chronic microvascular ischemic changes or migraine headaches.     PET Scan 8/3/2022:  No abnormal tracer uptake.    Since her last visit, she notices intermittent pulsatile tinnitus described as a heartbeat in her ears. She notes that she has a long-standing history (multiple  "years) of \"heart-beat\" noise in her left ear; this stops when she turns her head. She describes this noise as more of a \"thumping noise\" than \"whooshing.\"  She notes mild decline in bilateral vision, worse in the right eye attributed to increasing age. She has some dry eye. She denies recent TVOs, double vision, headaches, and nausea/vomiting. She is on diamox 250 mg BID, tolerating this medication well.    Her weight decreased to 112 following her lobectomy and returned to her normal of 120s-130s since.     She was diagnosed with non-small cell lung cancer, likely stage I, on 7/28/21. She has a brain MRI scheduled for 9/13, after which she notes decision will be made regarding possible left lower lobectomy.     Her visual acuity is 20/30 in the right eye (stable) and 20/30 -2 in the left eye (was 20/20). No APD both eyes. IOP is normal. Motility is full in both eyes. Color plates are full in both eyes. VF are full in both eyes.    OCT RNFL:  No plaquenil toxicity. Stable retinal nerve fiber layer thickness compared to 2018 with no active edema in the right eye. Stable retinal nerve fiber layer thickness in the left eye consistent will all prior exams.    GTOP: Reliable. Visual fields full in both eyes and stable from prior exams.    My impression is she has stable resolved of the right optic nerve secondary to idiopathic intracranial hypertension. Continue present management on diamox and follow up in 1 year.  Patient is on plaquenil.  There is no evidence of plaquenil toxicity.      Rheumatologist Dr. Santamaria from [facility] in [place], [place] (phone [phone], address [address] [address]) requests eye exam note be sent to verify lack of plaquenil toxicity.         Attending Physician Attestation:  Complete documentation of historical and exam elements from today's encounter can be found in the full encounter summary report (not reduplicated in this progress note).  I personally obtained " the chief complaint(s) and history of present illness.  I confirmed and edited as necessary the review of systems, past medical/surgical history, family history, social history, and examination findings as documented by others; and I examined the patient myself.  I personally reviewed the relevant tests, images, and reports as documented above.  I formulated and edited as necessary the assessment and plan and discussed the findings and management plan with the patient and family. I was present with the medical student who participated in the service and in the documentation of this note. - MD Kayley Azul, MS4

## 2022-09-15 NOTE — LETTER
9/15/2022         RE:  :  MRN: Yasmeen Mcdonald  1950  [id number]     Dear Dr. Santamaria:     Your patient, Yasmeen Mcdonald, returned for neuro-ophthalmic follow up. My assessment and plan are below.  For further details, please see my attached clinic note.      Assessment & Plan     Yasmeen Mcdonald is a 72 year old female with the following diagnoses:   1. Idiopathic intracranial hypertension    2. Visual field defect    3. 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   Diagnosed in . Patient initially had improvement of disc edema, but edema returned on discontinuation of Diamox 2016.  She had resolution of her disc edema 2017. Diamox stopped at that visit. In 2020 she had recurrence of right optic disc swelling.  She also takes vitamins that contain vitamin A.  At the time of last visit her optic disc edema was much improved.  At last visit, it was recommended continuing Diamox 250 mg twice a day.     She also has a history of lupus (at least 11 years) and currently taking Plaquenil 200 mg twice a day.  No evidence of plaquenil toxicity at time of last exam.     She also has a diagnosis of non-small cell lung cancer since last fall. Left lower lobectomy small cell carcinoma 2021 and radiation to right upper lobe for non-small cell cancer. PET scan demonstrated no spread from the primary tumor.     MRI orbits (2020):  -Normal orbits and visual pathway imaging  -Stable partially empty sella  -No acute infarction or intracranial abnormaltiy  -Chronic microvascular changes    MRI Brain W & W/O Contrast (2021):  IMPRESSION:   1. No evidence for intracranial metastatic disease or acute intracranial abnormality.   2. Few punctate T2 FLAIR hyperintensities in the supratentorial white matter are nonspecific, though typical for sequelae of minimal chronic microvascular  "ischemic changes or migraine headaches.     PET Scan 8/3/2022:  No abnormal tracer uptake.    Since her last visit, she notices intermittent pulsatile tinnitus described as a heartbeat in her ears. She notes that she has a long-standing history (multiple years) of \"heart-beat\" noise in her left ear; this stops when she turns her head. She describes this noise as more of a \"thumping noise\" than \"whooshing.\"  She notes mild decline in bilateral vision, worse in the right eye attributed to increasing age. She has some dry eye. She denies recent TVOs, double vision, headaches, and nausea/vomiting. She is on diamox 250 mg BID, tolerating this medication well.    Her weight decreased to 112 following her lobectomy and returned to her normal of 120s-130s since.     She was diagnosed with non-small cell lung cancer, likely stage I, on 7/28/21. She has a brain MRI scheduled for 9/13, after which she notes decision will be made regarding possible left lower lobectomy.     Her visual acuity is 20/30 in the right eye (stable) and 20/30 -2 in the left eye (was 20/20). No APD both eyes. IOP is normal. Motility is full in both eyes. Color plates are full in both eyes. VF are full in both eyes.    OCT RNFL:  No plaquenil toxicity. Stable retinal nerve fiber layer thickness compared to 2018 with no active edema in the right eye. Stable retinal nerve fiber layer thickness in the left eye consistent will all prior exams.    GTOP: Reliable. Visual fields full in both eyes and stable from prior exams.    My impression is she has stable resolved of the right optic nerve secondary to idiopathic intracranial hypertension. Continue present management on diamox and follow up in 1 year.  Patient is on plaquenil.  There is no evidence of plaquenil toxicity.      Rheumatologist Dr. Santamaria from [facility] in [place], [place] (phone [phone], address [address] [address]) requests eye exam note be sent to verify lack of " plaquenil toxicity.        Again, thank you for allowing me to participate in the care of your patient.      Sincerely,    Ryan Neville MD  Professor  Ophthalmology Residency   Director of Neuro-Ophthalmology  [employer] [facility] [employer] Chair  Departments of Ophthalmology, Neurology, and Neurosurgery  [facility] [address] [place]  [address]  T - [phone]  F - [phone]  [email or web address]      CC: Carola Loja MD  [facility]  [address]  [address]  [facility] [address]  Via Fax: [phone]     Lacie Santamaria MD  [facility]  [address]  [facility] [address]  Via Fax: [phone]

## 2022-09-15 NOTE — NURSING NOTE
Chief Complaint(s) and History of Present Illness(es)     Follow Up     In both eyes.  Charactertized as  blurred vision.  Occurring intermittently.  Since onset it is stable.  Associated symptoms include dryness.  Negative for double vision, eye pain and headache (+dryness, uses OTC allergy eyedrops). Additional comments: 1. Long-term use of Plaquenil     Had partial left lung removed and radiation done for the upper right cancer since her last exam.  Two different cancers in each lung.  Right eye vision is blurry.   DEMARCUS Ho 9/15/2022 8:56 AM

## 2023-09-06 DIAGNOSIS — G93.2 IDIOPATHIC INTRACRANIAL HYPERTENSION: ICD-10-CM

## 2023-09-06 RX ORDER — ACETAZOLAMIDE 250 MG/1
TABLET ORAL
Qty: 60 TABLET | Refills: 0 | Status: SHIPPED | OUTPATIENT
Start: 2023-09-06

## 2023-09-06 NOTE — TELEPHONE ENCOUNTER
acetaZOLAMIDE (DIAMOX) 250 MG tablet   180 tablet 3 9/15/2022       Last Office Visit : 9-  Future Office visit:  9-

## 2023-09-21 ENCOUNTER — OFFICE VISIT (OUTPATIENT)
Dept: OPHTHALMOLOGY | Facility: CLINIC | Age: 73
End: 2023-09-21
Attending: OPHTHALMOLOGY
Payer: MEDICARE

## 2023-09-21 DIAGNOSIS — Z79.899 LONG-TERM USE OF PLAQUENIL: ICD-10-CM

## 2023-09-21 DIAGNOSIS — G93.2 IDIOPATHIC INTRACRANIAL HYPERTENSION: Primary | ICD-10-CM

## 2023-09-21 DIAGNOSIS — G93.2 IDIOPATHIC INTRACRANIAL HYPERTENSION: ICD-10-CM

## 2023-09-21 DIAGNOSIS — H47.10 OPTIC DISC EDEMA: ICD-10-CM

## 2023-09-21 PROCEDURE — 92014 COMPRE OPH EXAM EST PT 1/>: CPT | Mod: GC | Performed by: OPHTHALMOLOGY

## 2023-09-21 PROCEDURE — 92134 CPTRZ OPH DX IMG PST SGM RTA: CPT | Performed by: OPHTHALMOLOGY

## 2023-09-21 PROCEDURE — 92082 INTERMEDIATE VISUAL FIELD XM: CPT | Performed by: OPHTHALMOLOGY

## 2023-09-21 PROCEDURE — G0463 HOSPITAL OUTPT CLINIC VISIT: HCPCS | Performed by: OPHTHALMOLOGY

## 2023-09-21 RX ORDER — MIRTAZAPINE 15 MG/1
15 TABLET, FILM COATED ORAL AT BEDTIME
COMMUNITY

## 2023-09-21 RX ORDER — FAMOTIDINE 20 MG/1
1 TABLET, FILM COATED ORAL 2 TIMES DAILY
COMMUNITY
Start: 2023-06-26

## 2023-09-21 RX ORDER — ACETAZOLAMIDE 250 MG/1
250 TABLET ORAL 2 TIMES DAILY
Qty: 180 TABLET | Refills: 3 | Status: SHIPPED | OUTPATIENT
Start: 2023-09-21

## 2023-09-21 ASSESSMENT — CUP TO DISC RATIO
OS_RATIO: 0.1
OD_RATIO: 0.1

## 2023-09-21 ASSESSMENT — EXTERNAL EXAM - LEFT EYE: OS_EXAM: NORMAL

## 2023-09-21 ASSESSMENT — VISUAL ACUITY
OD_SC: 20/30
OD_PH_SC: 20/20
OS_SC+: -2
OS_PH_SC: 20/20
METHOD: SNELLEN - LINEAR
OS_SC: 20/30
OS_PH_SC+: -2
OD_PH_SC+: -3

## 2023-09-21 ASSESSMENT — CONF VISUAL FIELD
OS_NORMAL: 1
OD_INFERIOR_TEMPORAL_RESTRICTION: 0
OD_NORMAL: 1
OD_INFERIOR_NASAL_RESTRICTION: 0
OS_INFERIOR_TEMPORAL_RESTRICTION: 0
OD_SUPERIOR_TEMPORAL_RESTRICTION: 0
OS_SUPERIOR_NASAL_RESTRICTION: 0
OS_SUPERIOR_TEMPORAL_RESTRICTION: 0
OS_INFERIOR_NASAL_RESTRICTION: 0
METHOD: COUNTING FINGERS
OD_SUPERIOR_NASAL_RESTRICTION: 0

## 2023-09-21 ASSESSMENT — SLIT LAMP EXAM - LIDS
COMMENTS: NORMAL
COMMENTS: NORMAL

## 2023-09-21 ASSESSMENT — TONOMETRY
OS_IOP_MMHG: 10
IOP_METHOD: ICARE
OD_IOP_MMHG: 18

## 2023-09-21 ASSESSMENT — EXTERNAL EXAM - RIGHT EYE: OD_EXAM: NORMAL

## 2023-09-21 NOTE — LETTER
2023         RE:  :  MRN: Yasmeen Mcdonald  1950  [id number]     Dear Dr. Santamaria,    Your patient, Yasmeen Mcdonald, returned for neuro-ophthalmic follow up. My assessment and plan are below.  For further details, please see my attached clinic note.      Assessment & Plan     Yasmeen Mcdonald is a 73 year old female with the following diagnoses:   1. Idiopathic intracranial hypertension    2. Long-term use of Plaquenil    3. Optic disc edema         Patient was last seen on 9/15/2022 for follow up of atypical IIH with optic disc edema of the right eye only and monitoring of plaquenil toxicity.  Patient was diagnosed with IIH in  with multiple recurrences of optic disc edema in the right eye after tapering off Diamox.  At the time of last visit the patient's optic disc edema was resolved and I recommended continuing on Diamox 250 mg twice a day.      The patient has a history of lupus diagnosed in  and has been taking plaquenil 200 mg twice a day since that time.  There was no evidence of plaquenil toxicity last visit.      Today patient is still on diamox 250mg BID without any symptoms of vision changes, headaches, pulsatile tinnitus or TVO's. Patient recently had lobectomy of the left lung and radiation for a different lung cancer which resulted in swings of weight both up and down.     VA 20/20 OD, 20/20 OS. Pupils no rAPD. Anterior segment wnl for age. Posterior segment without signs of retinal toxicity no bulls eye maculopathy, no signs of optic nerve edema OU. Strabismus N/A.     OCT Mac: no changes in the ellipsoid zone, no signs of retinal toxicity from plaquenil   VF 10-2: full field no defects OD, full field no defects OS          Assessment/Plan:   Patient with atypical IIH with multiple reoccurences that's currently on diamox 250mg appears stable on exam today no signs of optic nerve swelling on exam. Patient also has lupus and is currently taking plaquenil 200mg BID with no signs of toxicity of  OCT M and VF 10-2 today. At this time recommend continuing diamox 250mg BID with follow-up in 1 year or sooner PRN.           Again, thank you for allowing me to participate in the care of your patient.      Sincerely,    Ryan Neville MD  Professor  Ophthalmology Residency   Director of Neuro-Ophthalmology  [employer] Chair  Departments of Ophthalmology, Neurology, and Neurosurgery  [facility] [address]  [address] [place] [address]  [address]  T - [phone]  F - [phone]  [email or web address]      CC: Lacie Santamaria MD  [facility]  [address]  [place] [address]  Via Fax: [phone]     Carola Loja MD  [facility]  [address]  [address]  [place] [address]  Via Fax: [phone]     Chaparro Bryant MD  [facility]  [address]  [place] [address]  Via Fax: [phone]

## 2023-09-21 NOTE — PROGRESS NOTES
Assessment & Plan     Yasmeen Mcdonald is a 73 year old female with the following diagnoses:   1. Idiopathic intracranial hypertension    2. Long-term use of Plaquenil    3. Optic disc edema         Patient was last seen on 9/15/2022 for follow up of atypical IIH with optic disc edema of the right eye only and monitoring of plaquenil toxicity.  Patient was diagnosed with IIH in 2015 with multiple recurrences of optic disc edema in the right eye after tapering off Diamox.  At the time of last visit the patient's optic disc edema was resolved and I recommended continuing on Diamox 250 mg twice a day.      The patient has a history of lupus diagnosed in 2009 and has been taking plaquenil 200 mg twice a day since that time.  There was no evidence of plaquenil toxicity last visit.      Today patient is still on diamox 250mg BID without any symptoms of vision changes, headaches, pulsatile tinnitus or TVO's. Patient recently had lobectomy of the left lung and radiation for a different lung cancer which resulted in swings of weight both up and down.     VA 20/20 OD, 20/20 OS. Pupils no rAPD. Anterior segment wnl for age. Posterior segment without signs of retinal toxicity no bulls eye maculopathy, no signs of optic nerve edema OU. Strabismus N/A.     OCT Mac: no changes in the ellipsoid zone, no signs of retinal toxicity from plaquenil   VF 10-2: full field no defects OD, full field no defects OS          Assessment/Plan:   Patient with atypical IIH with multiple reoccurences that's currently on diamox 250mg appears stable on exam today no signs of optic nerve swelling on exam.  Patient is not experiencing any side effects related to the diamox.  We discussed continuing same dose versus going down to 1.5 tablets daily.  Patient is comfortable staying on the same dose.  Patient also has lupus and is currently taking plaquenil 200mg BID with no signs of toxicity of exam or testing today. Follow-up in 1 year or sooner as  needed for worsening symptoms.          Attending Physician Attestation:  Complete documentation of historical and exam elements from today's encounter can be found in the full encounter summary report (not reduplicated in this progress note).  I personally obtained the chief complaint(s) and history of present illness.  I confirmed and edited as necessary the review of systems, past medical/surgical history, family history, social history, and examination findings as documented by others; and I examined the patient myself.  I personally reviewed the relevant tests, images, and reports as documented above.  I formulated and edited as necessary the assessment and plan and discussed the findings and management plan with the patient and family. I personally reviewed the ophthalmic test(s) associated with this encounter, agree with the interpretation(s) as documented by the resident/fellow, and have edited the corresponding report(s) as necessary.  - Ryan Chacon MD   Fellow, Neuro-Ophthalmology

## 2024-09-23 DIAGNOSIS — H53.10 SUBJECTIVE VISUAL DISTURBANCE: Primary | ICD-10-CM

## 2024-09-23 DIAGNOSIS — Z79.899 LONG-TERM USE OF PLAQUENIL: ICD-10-CM

## 2024-09-26 ENCOUNTER — OFFICE VISIT (OUTPATIENT)
Dept: OPHTHALMOLOGY | Facility: CLINIC | Age: 74
End: 2024-09-26
Attending: OPHTHALMOLOGY
Payer: MEDICARE

## 2024-09-26 DIAGNOSIS — H53.10 SUBJECTIVE VISUAL DISTURBANCE: ICD-10-CM

## 2024-09-26 DIAGNOSIS — G93.2 IDIOPATHIC INTRACRANIAL HYPERTENSION: Primary | ICD-10-CM

## 2024-09-26 DIAGNOSIS — Z79.899 LONG-TERM USE OF PLAQUENIL: ICD-10-CM

## 2024-09-26 PROCEDURE — G0463 HOSPITAL OUTPT CLINIC VISIT: HCPCS | Performed by: OPHTHALMOLOGY

## 2024-09-26 PROCEDURE — 92014 COMPRE OPH EXAM EST PT 1/>: CPT | Performed by: OPHTHALMOLOGY

## 2024-09-26 PROCEDURE — 92134 CPTRZ OPH DX IMG PST SGM RTA: CPT | Performed by: OPHTHALMOLOGY

## 2024-09-26 PROCEDURE — 92082 INTERMEDIATE VISUAL FIELD XM: CPT | Performed by: OPHTHALMOLOGY

## 2024-09-26 RX ORDER — ACETAZOLAMIDE 250 MG/1
250 TABLET ORAL 2 TIMES DAILY
Qty: 180 TABLET | Refills: 3 | Status: SHIPPED | OUTPATIENT
Start: 2024-09-26

## 2024-09-26 RX ORDER — ATORVASTATIN CALCIUM 10 MG/1
TABLET, FILM COATED ORAL
COMMUNITY
Start: 2024-04-09

## 2024-09-26 ASSESSMENT — VISUAL ACUITY
OD_PH_SC: 20/25
OS_SC: 20/25
METHOD: SNELLEN - LINEAR
OS_SC+: -2
OD_SC: 20/30

## 2024-09-26 ASSESSMENT — CUP TO DISC RATIO
OD_RATIO: 0.1
OS_RATIO: 0.1

## 2024-09-26 ASSESSMENT — CONF VISUAL FIELD
OD_SUPERIOR_NASAL_RESTRICTION: 0
OS_NORMAL: 1
OS_INFERIOR_NASAL_RESTRICTION: 0
OD_SUPERIOR_TEMPORAL_RESTRICTION: 0
OD_NORMAL: 1
OS_SUPERIOR_NASAL_RESTRICTION: 0
METHOD: COUNTING FINGERS
OS_INFERIOR_TEMPORAL_RESTRICTION: 0
OD_INFERIOR_NASAL_RESTRICTION: 0
OD_INFERIOR_TEMPORAL_RESTRICTION: 0
OS_SUPERIOR_TEMPORAL_RESTRICTION: 0

## 2024-09-26 ASSESSMENT — SLIT LAMP EXAM - LIDS
COMMENTS: NORMAL
COMMENTS: NORMAL

## 2024-09-26 ASSESSMENT — EXTERNAL EXAM - LEFT EYE: OS_EXAM: NORMAL

## 2024-09-26 ASSESSMENT — TONOMETRY
OD_IOP_MMHG: 20
IOP_METHOD: ICARE
OS_IOP_MMHG: 19

## 2024-09-26 ASSESSMENT — EXTERNAL EXAM - RIGHT EYE: OD_EXAM: NORMAL

## 2024-09-26 NOTE — PROGRESS NOTES
Assessment & Plan     Yasmeen Mcdonald is a 73 year old female with the following diagnoses:   1. Idiopathic intracranial hypertension    2. Subjective visual disturbance    3. 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  Patient was diagnosed with IIH in 2015 with multiple recurrences of optic disc edema in the right eye after tapering off Diamox.  At the time of last visit the patient's optic disc edema was resolved and I recommended continuing on Diamox 250 mg twice a day.      The patient has a history of lupus diagnosed in 2009 and has been taking plaquenil 200 mg twice a day since that time.  There was no evidence of plaquenil toxicity last visit.      Today patient is still on diamox 250mg BID without any symptoms of vision changes (other than age related difficulty at near), headaches, pulsatile tinnitus or TVO's. Patient does endorse low pitched humming in her ears    VA 20/25 (ph) right eye (1 line decrease since last year), 20/25-2 left eye (1 line decrease since last year). Pupils no rAPD. Anterior segment wnl for age. Posterior segment without signs of retinal toxicity no bulls eye maculopathy, no signs of optic nerve edema OU. Strabismus N/A.     OCT Mac: no changes in the ellipsoid zone, no signs of retinal toxicity from plaquenil   VF 10-2: full field no defects OD, full field no defects OS        Assessment/Plan:   Patient with atypical IIH with multiple reoccurences that's currently on diamox 250mg twice a day and appears stable on exam today no signs of optic nerve swelling on exam.  Patient is not experiencing any side effects related to the diamox.  We discussed continuing same dose versus going down to 1.5 tablets daily.  Patient does not wish to reduce dose at this time.   New prescription given for diamox for 1 year.      Patient also has lupus and is  currently taking plaquenil 200mg BID with no signs of toxicity of exam or testing today. Follow-up in 1 year or sooner as needed for worsening symptoms.      Lidia Walters, MS 4    Attending Physician Attestation:  Complete documentation of historical and exam elements from today's encounter can be found in the full encounter summary report (not reduplicated in this progress note).  I personally obtained the chief complaint(s) and history of present illness.  I confirmed and edited as necessary the review of systems, past medical/surgical history, family history, social history, and examination findings as documented by others; and I examined the patient myself.  I personally reviewed the relevant tests, images, and reports as documented above.  I formulated and edited as necessary the assessment and plan and discussed the findings and management plan with the patient and family. I was present with the medical student who participated in the service and in the documentation of this note. - Ryan Neville MD

## 2024-09-26 NOTE — LETTER
2024     RE:  :  MRN: Yasmeen Mcdonald  1950  [id number]     Dear Dr. Santamaria:     Your patient,Yasmeen Mcdonald, returned for neuro-ophthalmic follow up. My assessment and plan are below.  For further details, please see my attached clinic note.      Assessment & Plan     Yasmeen Mcdonald is a 73 year old female with the following diagnoses:   1. Idiopathic intracranial hypertension    2. Subjective visual disturbance    3. 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  Patient was diagnosed with IIH in  with multiple recurrences of optic disc edema in the right eye after tapering off Diamox.  At the time of last visit the patient's optic disc edema was resolved and I recommended continuing on Diamox 250 mg twice a day.      The patient has a history of lupus diagnosed in  and has been taking plaquenil 200 mg twice a day since that time.  There was no evidence of plaquenil toxicity last visit.      Today patient is still on diamox 250mg BID without any symptoms of vision changes (other than age related difficulty at near), headaches, pulsatile tinnitus or TVO's. Patient does endorse low pitched humming in her ears    VA 20/25 (ph) right eye (1 line decrease since last year), 20/25-2 left eye (1 line decrease since last year). Pupils no rAPD. Anterior segment wnl for age. Posterior segment without signs of retinal toxicity no bulls eye maculopathy, no signs of optic nerve edema OU. Strabismus N/A.     OCT Mac: no changes in the ellipsoid zone, no signs of retinal toxicity from plaquenil   VF 10-2: full field no defects OD, full field no defects OS        Assessment/Plan:   Patient with atypical IIH with multiple reoccurences that's currently on diamox 250mg twice a day and appears stable on exam today no signs of optic nerve swelling on exam.  Patient is not  experiencing any side effects related to the diamox.  We discussed continuing same dose versus going down to 1.5 tablets daily.  Patient does not wish to reduce dose at this time.   New prescription given for diamox for 1 year.      Patient also has lupus and is currently taking plaquenil 200mg BID with no signs of toxicity of exam or testing today. Follow-up in 1 year or sooner as needed for worsening symptoms.      Lidia Walters, MS 4    Attending Physician Attestation:  Complete documentation of historical and exam elements from today's encounter can be found in the full encounter summary report (not reduplicated in this progress note).  I personally obtained the chief complaint(s) and history of present illness.  I confirmed and edited as necessary the review of systems, past medical/surgical history, family history, social history, and examination findings as documented by others; and I examined the patient myself.  I personally reviewed the relevant tests, images, and reports as documented above.  I formulated and edited as necessary the assessment and plan and discussed the findings and management plan with the patient and family. I was present with the medical student who participated in the service and in the documentation of this note. - Ryan Neville MD       Again, thank you for allowing me to participate in the care of your patient.      Sincerely,      Ryan Neville MD  Professor  Director of Neuro-Ophthalmology  [employer] [facility] [employer] End[facility] Chair  Departments of Ophthalmology, Neurology, and Neurosurgery  [facility] [address] [place] [address]  [id number]  T - [phone]  F - [phone]  [email or web address]      CC: Lacie Santamaria MD  [facility]  [address]  [place] [id number]  Via In Basket

## 2024-09-26 NOTE — NURSING NOTE
Chief Complaint(s) and History of Present Illness(es)       Papilledema Follow Up    In both eyes.  Associated symptoms include dryness.  Negative for eye pain.  Pain was noted as 0/10. Additional comments: Annual eye exam for IIH.  Intermittently blurry vision at near but clears up when she wears her reading glasses.  Currently on Diamox 250 mg twice daily. She has on and off dry eyes, using artificial tears when needed. Has Lupus and has been taking Plaquenil.   DEMARCUS Ho 9/26/2024 12:07 PM

## 2025-06-26 NOTE — TELEPHONE ENCOUNTER
ACETAZOLAMIDE 250 MG TABLET    Requested directions:  Take 1 tablet (250 mg) by mouth 2 times daily - Oral  Current directions on the medication list:  Take 1 tablet (250 mg) by mouth 2 times daily - Oral    Last Written Prescription Date:  9/15/44163  Last Fill Quantity: 60,   # refills: 11    Last Office Visit:  9/15/2020  Future Office visit: None    Attending Provider:    Ryan Neville MD  Ophthalmology     Last Clinic Note:  9/15/2020    My impression is she has edema of the right optic nerve. Given that she is having clear fluid coming out from her left ear, this could suggesting a cerebrospinal fluid leak.  However, it is unclear that she has a recurrence of Idiopathic Intracranial Hypertension (IIH) since only one optic nerve is swollen, and I would do MRI orbit with and with out contrast. Restart diamox at 250 twice a day (she is only 140 lbs).  Follow up 6-8 weeks sooner as needed for worsening symptoms.       She does not have plaquenil toxicity.      Routing refill request to provider for review/approval because:  Pt asked to follow up in 6-8 weeks.    No follow up visit noted.     Continue same dose?  Follow up with Pt?   Please advise     Kimberly Ramos RN  Central Triage Red Flags/Med Refills    
Detail Level: Zone
Medical Necessity Clause: Botulinum toxin hyperhidrosis therapy can be medically necessary because
Patient Specific Counseling (Will Not Stick From Patient To Patient): ***\\nDiscussed the treatment, iontophoresis. Patient will continue Oxybutynin for 6-8 weeks then determine if any treatment will be necessary.
Medical Necessity Information: LCD Guidelines vary from payer to payer. Please check with your payer's policy to determine medical necessity.
Detail Level: Simple